# Patient Record
Sex: MALE | Race: WHITE | NOT HISPANIC OR LATINO | Employment: OTHER | URBAN - METROPOLITAN AREA
[De-identification: names, ages, dates, MRNs, and addresses within clinical notes are randomized per-mention and may not be internally consistent; named-entity substitution may affect disease eponyms.]

---

## 2023-03-21 LAB — SARS-COV-2 RESULT: NOT DETECTED

## 2023-03-23 ENCOUNTER — HOSPITAL ENCOUNTER (OUTPATIENT)
Dept: DATA CONVERSION | Facility: HOSPITAL | Age: 78
End: 2023-03-25
Attending: STUDENT IN AN ORGANIZED HEALTH CARE EDUCATION/TRAINING PROGRAM | Admitting: STUDENT IN AN ORGANIZED HEALTH CARE EDUCATION/TRAINING PROGRAM
Payer: MEDICARE

## 2023-03-23 DIAGNOSIS — I10 ESSENTIAL (PRIMARY) HYPERTENSION: ICD-10-CM

## 2023-03-23 DIAGNOSIS — N13.8 OTHER OBSTRUCTIVE AND REFLUX UROPATHY: ICD-10-CM

## 2023-03-23 DIAGNOSIS — D64.9 ANEMIA, UNSPECIFIED: ICD-10-CM

## 2023-03-23 DIAGNOSIS — E11.9 TYPE 2 DIABETES MELLITUS WITHOUT COMPLICATIONS (MULTI): ICD-10-CM

## 2023-03-23 DIAGNOSIS — K21.9 GASTRO-ESOPHAGEAL REFLUX DISEASE WITHOUT ESOPHAGITIS: ICD-10-CM

## 2023-03-23 DIAGNOSIS — E78.5 HYPERLIPIDEMIA, UNSPECIFIED: ICD-10-CM

## 2023-03-23 DIAGNOSIS — N40.1 BENIGN PROSTATIC HYPERPLASIA WITH LOWER URINARY TRACT SYMPTOMS: ICD-10-CM

## 2023-03-23 DIAGNOSIS — Z79.84 LONG TERM (CURRENT) USE OF ORAL HYPOGLYCEMIC DRUGS: ICD-10-CM

## 2023-03-23 DIAGNOSIS — Z87.891 PERSONAL HISTORY OF NICOTINE DEPENDENCE: ICD-10-CM

## 2023-03-23 LAB
POCT GLUCOSE: 80 MG/DL (ref 74–99)
POCT GLUCOSE: 82 MG/DL (ref 74–99)

## 2023-03-24 LAB
ANION GAP IN SER/PLAS: 11 MMOL/L (ref 10–20)
CALCIUM (MG/DL) IN SER/PLAS: 8.3 MG/DL (ref 8.6–10.3)
CARBON DIOXIDE, TOTAL (MMOL/L) IN SER/PLAS: 26 MMOL/L (ref 21–32)
CHLORIDE (MMOL/L) IN SER/PLAS: 105 MMOL/L (ref 98–107)
CREATININE (MG/DL) IN SER/PLAS: 1.19 MG/DL (ref 0.5–1.3)
ERYTHROCYTE DISTRIBUTION WIDTH (RATIO) BY AUTOMATED COUNT: 12.5 % (ref 11.5–14.5)
ERYTHROCYTE MEAN CORPUSCULAR HEMOGLOBIN CONCENTRATION (G/DL) BY AUTOMATED: 33.6 G/DL (ref 32–36)
ERYTHROCYTE MEAN CORPUSCULAR VOLUME (FL) BY AUTOMATED COUNT: 94 FL (ref 80–100)
ERYTHROCYTES (10*6/UL) IN BLOOD BY AUTOMATED COUNT: 3.5 X10E12/L (ref 4.5–5.9)
GFR MALE: 63 ML/MIN/1.73M2
GLUCOSE (MG/DL) IN SER/PLAS: 93 MG/DL (ref 74–99)
HEMATOCRIT (%) IN BLOOD BY AUTOMATED COUNT: 33 % (ref 41–52)
HEMOGLOBIN (G/DL) IN BLOOD: 11.1 G/DL (ref 13.5–17.5)
LEUKOCYTES (10*3/UL) IN BLOOD BY AUTOMATED COUNT: 11.5 X10E9/L (ref 4.4–11.3)
PLATELETS (10*3/UL) IN BLOOD AUTOMATED COUNT: 161 X10E9/L (ref 150–450)
POCT GLUCOSE: 89 MG/DL (ref 74–99)
POTASSIUM (MMOL/L) IN SER/PLAS: 4.5 MMOL/L (ref 3.5–5.3)
SODIUM (MMOL/L) IN SER/PLAS: 137 MMOL/L (ref 136–145)
UREA NITROGEN (MG/DL) IN SER/PLAS: 14 MG/DL (ref 6–23)

## 2023-03-28 LAB
COMPLETE PATHOLOGY REPORT: NORMAL
CONVERTED CLINICAL DIAGNOSIS-HISTORY: NORMAL
CONVERTED FINAL DIAGNOSIS: NORMAL
CONVERTED FINAL REPORT PDF LINK TO COPY AND PASTE: NORMAL
CONVERTED GROSS DESCRIPTION: NORMAL

## 2023-08-31 PROBLEM — N40.1 BENIGN PROSTATIC HYPERPLASIA WITH LOWER URINARY TRACT SYMPTOMS: Status: ACTIVE | Noted: 2023-08-31

## 2023-08-31 PROBLEM — R31.9 HEMATURIA: Status: ACTIVE | Noted: 2019-06-14

## 2023-08-31 PROBLEM — J35.8 MUCOCELE OF TONSIL: Status: ACTIVE | Noted: 2021-01-25

## 2023-08-31 PROBLEM — I82.90 VENOUS THROMBOSIS: Status: ACTIVE | Noted: 2023-08-31

## 2023-08-31 PROBLEM — I10 ESSENTIAL (PRIMARY) HYPERTENSION: Status: ACTIVE | Noted: 2018-08-05

## 2023-08-31 PROBLEM — M50.322 OTHER CERVICAL DISC DEGENERATION AT C5-C6 LEVEL: Status: ACTIVE | Noted: 2019-03-27

## 2023-08-31 PROBLEM — N18.30 STAGE 3 CHRONIC KIDNEY DISEASE (MULTI): Status: ACTIVE | Noted: 2018-08-13

## 2023-08-31 PROBLEM — M54.2 CERVICALGIA: Status: ACTIVE | Noted: 2019-03-27

## 2023-08-31 PROBLEM — N28.9 ABNORMAL KIDNEY FUNCTION: Status: ACTIVE | Noted: 2018-08-12

## 2023-08-31 PROBLEM — R13.10 DYSPHAGIA: Status: ACTIVE | Noted: 2020-05-26

## 2023-08-31 PROBLEM — E78.5 HYPERLIPIDEMIA: Status: ACTIVE | Noted: 2019-06-14

## 2023-08-31 PROBLEM — E03.9 HYPOTHYROIDISM, UNSPECIFIED: Status: ACTIVE | Noted: 2020-11-19

## 2023-08-31 PROBLEM — R07.9 CHEST PAIN: Status: ACTIVE | Noted: 2023-08-31

## 2023-08-31 PROBLEM — J06.9 UPPER RESPIRATORY INFECTION: Status: ACTIVE | Noted: 2021-01-25

## 2023-08-31 PROBLEM — R74.01 ELEVATED TRANSAMINASE LEVEL: Status: ACTIVE | Noted: 2020-11-19

## 2023-08-31 PROBLEM — I10 BENIGN ESSENTIAL HTN: Status: ACTIVE | Noted: 2018-08-13

## 2023-08-31 PROBLEM — E78.2 MIXED HYPERLIPIDEMIA: Status: ACTIVE | Noted: 2018-08-28

## 2023-08-31 PROBLEM — K21.9 GASTROESOPHAGEAL REFLUX DISEASE: Status: ACTIVE | Noted: 2019-06-14

## 2023-08-31 PROBLEM — E11.9 TYPE 2 DIABETES MELLITUS WITHOUT COMPLICATIONS (MULTI): Status: ACTIVE | Noted: 2018-08-13

## 2023-08-31 PROBLEM — K40.90 LEFT INGUINAL HERNIA: Status: ACTIVE | Noted: 2023-08-31

## 2023-08-31 PROBLEM — K56.609 SMALL BOWEL OBSTRUCTION (MULTI): Status: ACTIVE | Noted: 2019-02-20

## 2023-08-31 PROBLEM — M54.12 CERVICAL RADICULITIS: Status: ACTIVE | Noted: 2019-03-27

## 2023-08-31 PROBLEM — K42.9 UMBILICAL HERNIA: Status: ACTIVE | Noted: 2023-08-31

## 2023-08-31 RX ORDER — METFORMIN HYDROCHLORIDE 500 MG/1
500 TABLET ORAL
COMMUNITY
Start: 2023-05-12 | End: 2023-12-21 | Stop reason: SDUPTHER

## 2023-08-31 RX ORDER — DESOXIMETASONE 2.5 MG/G
1 CREAM TOPICAL 2 TIMES DAILY
COMMUNITY

## 2023-08-31 RX ORDER — FINASTERIDE 5 MG/1
5 TABLET, FILM COATED ORAL DAILY
COMMUNITY
End: 2023-12-13 | Stop reason: WASHOUT

## 2023-08-31 RX ORDER — LISINOPRIL 20 MG/1
20 TABLET ORAL DAILY
COMMUNITY
Start: 2023-07-12 | End: 2023-11-29

## 2023-08-31 RX ORDER — ACETAMINOPHEN 325 MG/1
TABLET ORAL
COMMUNITY

## 2023-08-31 RX ORDER — MIRABEGRON 50 MG/1
50 TABLET, EXTENDED RELEASE ORAL DAILY
COMMUNITY
End: 2023-12-13 | Stop reason: WASHOUT

## 2023-08-31 RX ORDER — OMEPRAZOLE 40 MG/1
40 CAPSULE, DELAYED RELEASE ORAL
COMMUNITY
Start: 2023-07-12 | End: 2023-11-24

## 2023-08-31 RX ORDER — TAMSULOSIN HYDROCHLORIDE 0.4 MG/1
2 CAPSULE ORAL DAILY
COMMUNITY
Start: 2022-04-28 | End: 2023-12-13 | Stop reason: WASHOUT

## 2023-08-31 RX ORDER — ATORVASTATIN CALCIUM 20 MG/1
20 TABLET, FILM COATED ORAL DAILY
COMMUNITY
Start: 2023-07-12 | End: 2023-12-08

## 2023-08-31 RX ORDER — ECONAZOLE NITRATE 10 MG/G
1 CREAM TOPICAL DAILY
COMMUNITY

## 2023-08-31 RX ORDER — PSYLLIUM HUSK 3.4 G/5.8G
3.4 POWDER ORAL DAILY
COMMUNITY
Start: 2020-05-26

## 2023-08-31 RX ORDER — EMOLLIENT COMBINATION NO.76
1 LOTION (ML) TOPICAL
COMMUNITY
Start: 2020-05-26

## 2023-09-08 VITALS — WEIGHT: 160.72 LBS | BODY MASS INDEX: 25.22 KG/M2 | HEIGHT: 67 IN

## 2023-09-14 NOTE — DISCHARGE SUMMARY
Send Summary:   Discharge Summary Providers:  Provider Role Provider Name   · Attending Ramirez Mireles   · Referring Ramirez Mireles   · Primary Lawanda Garcia       Note Recipients: Lawanda Garcia, APRN-CNP - [phone]  []  Ramirez Mireles MD       Discharge:    Summary:   Admission Date: .23-Mar-2023 10:56:00   Discharge Date: 25-Mar-2023   Attending Physician at Discharge: Ramirez Mireles   Admission Reason: BPH with obstruction   Final Discharge Diagnoses: Transurethral Resection  of Prostate (TURP)   Procedures: Date: 23-Mar-2023 15:35:00  Procedure Name: 1. Transurethral Resection of Prostate   Condition at Discharge: Satisfactory   Disposition at Discharge: .Home   Vital Signs:        T   P  R  BP   MAP  SpO2   Value  36.2  60  18  114/49   92  100%  Date/Time 3/24 10:19 3/24 10:19 3/24 10:19 3/24 10:19  3/24 5:20 3/24 10:19  Range  (36.2C - 36.3C )  (52 - 79 )  (16 - 18 )  (114 - 166 )/ (49 - 80 )  (89 - 109 )  (95% - 100% )    Date:            Weight/Scale Type:  Height:   23-Mar-2023 16:52  72.9  kg / bed  170.1  cm  Physical Exam:    PHYSICAL EXAM:  General:  NAD, well-nourished, well-developed  HEENT:  NC/AT, MMM, PERRLA, EOMI  Pulmonary: CTA bilaterally, neck supple, no LAD  Cardiovascular:  RRR, no M/R/G  Abdomen: soft, NT/ND, +BSx4  Extremities: no C/C/E, PPPx4, ROZ x4  Neurological: CN II-XII intact, gait WNL, no focal abnormalities  Skin: dry and warm, no rashes    Hospital Course:    77 year old male s/p Transurethral Resection of Prostate on 3-23-23 with Dr. Mireles.  The patient tolerated the procedure well and there were no complications.   Labs showed a very mild anemia with H/H 11.1/33.0.  A chandra catheter was left in place and removed prior to discharge.  He was able to void without difficulty.  Mr. Henson was tolerating a regular diet and ambulating without difficulty therefore he was  discharged home with instructions to follow-up with Dr. Mireles in two weeks.        Discharge Information:    and Continuing Care:   Lab Results - Pending:    Surgical Pathology Drawn at 23-Mar-2023 14:57:00  Radiology Results - Pending: None   Discharge Instructions:    Activity:           activity as tolerated.          May shower..            May not return to school/work.            Walk around intermittently throughout the day, stay active.    Nutrition/Diet:           diabetic/carbohydrate counted          Encourage Fluids:   Drink plenty of water daily, stay hydrated.    Follow Up Appointments:    Follow-Up Appointment 01:           Physician/Dept/Service:   Dr. Mireles          Reason for Referral:   Urology          Call to Schedule in:   2 weeks          Phone Number:   [phone]    Discharge Medications: Home Medication   lisinopril 20 mg oral tablet - 1 tab(s) orally once a day  Metamucil 3.4 g/3.7 g oral powder for reconstitution - 1 dose(s) orally once a day  metFORMIN 500 mg oral tablet, extended release - 1 tab(s) orally once a day  omeprazole 40 mg oral delayed release capsule - 1 cap(s) orally once a day  atorvastatin 20 mg oral tablet - 1 tab(s) orally once a day (at bedtime)  PreserVision AREDS oral capsule - 1 cap(s) orally once a day  Curel Daily Healing topical lotion - Apply topically to affected area once a day     PRN Medication   Econazole Nitrate 1% topical cream - Apply topically to affected area once a day, As Needed  fluocinolone 0.01% topical oil - Apply topically to affected area 2 times a day, As Needed  desoximetasone 0.05% topical cream - Apply topically to affected area 2 times a day, As Needed   Issues to Discuss at Follow-up / Goals for Continuing Care:    voiding problems?   activity     DNR Status:   ·  Code Status Code Status order at time of discharge: Full Code       Electronic Signatures:  Chelly Bennett (PHYSICIAN))  (Signed 24-Mar-2023 14:46)   Authored: Send Summary, Summary Content, Ongoing Care,  DNR Status, Note Completion      Last  Updated: 24-Mar-2023 14:46 by Chelly Bennett (PA (PHYSICIAN))

## 2023-09-14 NOTE — H&P
History & Physical Reviewed:   I have reviewed the History and Physical dated:  16-Mar-2023   History and Physical reviewed and relevant findings noted. Patient examined to review pertinent physical  findings.: No significant changes   Home Medications Reviewed: no changes noted   Allergies Reviewed: no changes noted       ERAS (Enhanced Recovery After Surgery):  ·  ERAS Patient: no     Consent:   COVID-19 Consent:  ·  COVID-19 Risk Consent Surgeon has reviewed key risks related to the risk of zach COVID-19 and if they contract COVID-19 what the risks are.       Electronic Signatures:  Ramirez Mireles)  (Signed 23-Mar-2023 07:28)   Authored: History & Physical Reviewed, ERAS, Consent,  Note Completion      Last Updated: 23-Mar-2023 07:28 by Ramirez Mireles)

## 2023-10-02 NOTE — OP NOTE
PROCEDURE DETAILS    Preoperative Diagnosis:  Benign Prostatic Hyperplasia with Obstruction  Postoperative Diagnosis:  Benign Prostatic Hyperplasia with Obstruction  Surgeon: Ramirez Mireles  Resident/Fellow/Other Assistant: Shravan Cazares    Procedure:  1. Transurethral Resection of Prostate  Anesthesia: Jan Espinoza  Estimated Blood Loss: 10 cc  Findings: obstructive median lobe and high bladder neck  Specimens(s) Collected: yes,  Prostate tissue chips   Complications: None  Drains and/or Catheters: 22 Fr 3-way Ram  Patient Returned To/Condition: PACU/stable        Operative Report:   Patient was consented and antibiotics  were started on-call to the OR.  In the operating room, under general anesthesia, patient placed in dorsolithotomy position, genitalia was prepped and draped in usual manner.  No 22 cystoscope was first inserted down the urethra and prostate, revealing an obstructive median lobe and high bladder neck, with bulging lateral lobes. Bladder was intact with normal orthotopic ureteral orifices, no tumors or stones.  No. 26 resectoscope was inserted down the urethra into the prostate and bladder.  Using the loop on the working element, the median lobe was first resected from bladder neck to veromontanum, then the lateral lobes were resected circumferentially until  the prostatic fossa was wide open. Extensive hemostasis was performed.  All resected chips were removed and sent for pathological examination.   No 22 French 3-way Ram catheter was inserted, and returned urine was light pink in color.  Patient was awakened and transferred to PACU in stable condition.                                Attestation:   Note Completion:  Attending Attestation I performed the procedure without a resident         Electronic Signatures:  Ramirez Mireles)  (Signed 23-Mar-2023 15:41)   Authored: Post-Operative Note, Chart Review, Note Completion      Last Updated: 23-Mar-2023 15:41 by Chi  Ramirez STEWART)

## 2023-10-09 ENCOUNTER — APPOINTMENT (OUTPATIENT)
Dept: UROLOGY | Facility: CLINIC | Age: 78
End: 2023-10-09
Payer: MEDICARE

## 2023-11-02 ENCOUNTER — OFFICE VISIT (OUTPATIENT)
Dept: UROLOGY | Facility: CLINIC | Age: 78
End: 2023-11-02
Payer: MEDICARE

## 2023-11-02 DIAGNOSIS — N40.1 BENIGN PROSTATIC HYPERPLASIA WITH URINARY FREQUENCY: ICD-10-CM

## 2023-11-02 DIAGNOSIS — R35.0 BENIGN PROSTATIC HYPERPLASIA WITH URINARY FREQUENCY: ICD-10-CM

## 2023-11-02 PROCEDURE — 3074F SYST BP LT 130 MM HG: CPT | Performed by: STUDENT IN AN ORGANIZED HEALTH CARE EDUCATION/TRAINING PROGRAM

## 2023-11-02 PROCEDURE — 3078F DIAST BP <80 MM HG: CPT | Performed by: STUDENT IN AN ORGANIZED HEALTH CARE EDUCATION/TRAINING PROGRAM

## 2023-11-02 PROCEDURE — 99214 OFFICE O/P EST MOD 30 MIN: CPT | Performed by: STUDENT IN AN ORGANIZED HEALTH CARE EDUCATION/TRAINING PROGRAM

## 2023-11-02 RX ORDER — SOLIFENACIN SUCCINATE 10 MG/1
10 TABLET, FILM COATED ORAL DAILY
Qty: 30 TABLET | Refills: 11 | Status: SHIPPED | OUTPATIENT
Start: 2023-11-02 | End: 2024-11-01

## 2023-11-02 NOTE — PROGRESS NOTES
Subjective     HPI  Pablo Henson is a 78 y.o. male presents for a follow up on BPH/LUTS and s/p TURP on 03/23/2023.    Taking Myrbetriq but is unsure if it has helped his LUTS. Still experiencing urgency and leakage, not satisfied about this issue however overall learning to accept it.    PATHOLOGY 3/23/2023  FINAL DIAGNOSIS   A.  PROSTATE TISSUE:   PROSTATIC TISSUE WITH GLANDULAR HYPERPLASIA AND STROMAL HYPERTROPHY.     Assessment/Plan   There are no diagnoses linked to this encounter.    Pablo Henson is a 78 y.o. male presents for a follow up on BPH/LUTS and s/p TURP on 03/23/2023.    We discussed his LUTS and his response to Myrbetriq. He is not responding to the medication appropriately so we discussed other pharmacological options.      Plan:  Stop Myrbetriq  Start Vesicare 10mg daily  Fu in 3-4 months      Scribe Attestation  By signing my name below, I, Love Keller , Scribe   attest that this documentation has been prepared under the direction and in the presence of Ramirez Mireles MD.

## 2023-11-10 ENCOUNTER — TELEPHONE (OUTPATIENT)
Dept: PRIMARY CARE | Facility: CLINIC | Age: 78
End: 2023-11-10
Payer: MEDICARE

## 2023-11-10 DIAGNOSIS — I10 BENIGN ESSENTIAL HTN: ICD-10-CM

## 2023-11-10 DIAGNOSIS — E78.5 HYPERLIPIDEMIA, UNSPECIFIED HYPERLIPIDEMIA TYPE: ICD-10-CM

## 2023-11-10 DIAGNOSIS — E78.2 MIXED HYPERLIPIDEMIA: ICD-10-CM

## 2023-11-10 DIAGNOSIS — E03.9 HYPOTHYROIDISM, UNSPECIFIED TYPE: ICD-10-CM

## 2023-11-10 DIAGNOSIS — N18.30 STAGE 3 CHRONIC KIDNEY DISEASE, UNSPECIFIED WHETHER STAGE 3A OR 3B CKD (MULTI): ICD-10-CM

## 2023-11-10 DIAGNOSIS — E11.9 TYPE 2 DIABETES MELLITUS WITHOUT COMPLICATION, WITHOUT LONG-TERM CURRENT USE OF INSULIN (MULTI): ICD-10-CM

## 2023-12-06 ENCOUNTER — LAB (OUTPATIENT)
Dept: LAB | Facility: LAB | Age: 78
End: 2023-12-06
Payer: MEDICARE

## 2023-12-06 DIAGNOSIS — E78.5 HYPERLIPIDEMIA, UNSPECIFIED HYPERLIPIDEMIA TYPE: ICD-10-CM

## 2023-12-06 DIAGNOSIS — E03.9 HYPOTHYROIDISM, UNSPECIFIED TYPE: ICD-10-CM

## 2023-12-06 DIAGNOSIS — E78.2 MIXED HYPERLIPIDEMIA: ICD-10-CM

## 2023-12-06 DIAGNOSIS — E11.9 TYPE 2 DIABETES MELLITUS WITHOUT COMPLICATION, WITHOUT LONG-TERM CURRENT USE OF INSULIN (MULTI): ICD-10-CM

## 2023-12-06 DIAGNOSIS — I10 BENIGN ESSENTIAL HTN: ICD-10-CM

## 2023-12-06 DIAGNOSIS — N18.30 STAGE 3 CHRONIC KIDNEY DISEASE, UNSPECIFIED WHETHER STAGE 3A OR 3B CKD (MULTI): ICD-10-CM

## 2023-12-06 LAB
ALBUMIN SERPL-MCNC: 4.5 G/DL (ref 3.5–5)
ALP BLD-CCNC: 122 U/L (ref 35–125)
ALT SERPL-CCNC: 41 U/L (ref 5–40)
ANION GAP SERPL CALC-SCNC: 14 MMOL/L
AST SERPL-CCNC: 23 U/L (ref 5–40)
BASOPHILS # BLD AUTO: 0.04 X10*3/UL (ref 0–0.1)
BASOPHILS NFR BLD AUTO: 0.6 %
BILIRUB SERPL-MCNC: 0.6 MG/DL (ref 0.1–1.2)
BUN SERPL-MCNC: 20 MG/DL (ref 8–25)
CALCIUM SERPL-MCNC: 9.4 MG/DL (ref 8.5–10.4)
CHLORIDE SERPL-SCNC: 101 MMOL/L (ref 97–107)
CHOLEST SERPL-MCNC: 122 MG/DL (ref 133–200)
CHOLEST/HDLC SERPL: 2.9 {RATIO}
CO2 SERPL-SCNC: 24 MMOL/L (ref 24–31)
CREAT SERPL-MCNC: 1.6 MG/DL (ref 0.4–1.6)
EOSINOPHIL # BLD AUTO: 0.19 X10*3/UL (ref 0–0.4)
EOSINOPHIL NFR BLD AUTO: 2.8 %
ERYTHROCYTE [DISTWIDTH] IN BLOOD BY AUTOMATED COUNT: 12.4 % (ref 11.5–14.5)
EST. AVERAGE GLUCOSE BLD GHB EST-MCNC: 123 MG/DL
GFR SERPL CREATININE-BSD FRML MDRD: 44 ML/MIN/1.73M*2
GLUCOSE SERPL-MCNC: 100 MG/DL (ref 65–99)
HBA1C MFR BLD: 5.9 %
HCT VFR BLD AUTO: 42.8 % (ref 41–52)
HDLC SERPL-MCNC: 42 MG/DL
HGB BLD-MCNC: 14.3 G/DL (ref 13.5–17.5)
IMM GRANULOCYTES # BLD AUTO: 0.02 X10*3/UL (ref 0–0.5)
IMM GRANULOCYTES NFR BLD AUTO: 0.3 % (ref 0–0.9)
LDLC SERPL CALC-MCNC: 52 MG/DL (ref 65–130)
LYMPHOCYTES # BLD AUTO: 1.92 X10*3/UL (ref 0.8–3)
LYMPHOCYTES NFR BLD AUTO: 28 %
MCH RBC QN AUTO: 31.8 PG (ref 26–34)
MCHC RBC AUTO-ENTMCNC: 33.4 G/DL (ref 32–36)
MCV RBC AUTO: 95 FL (ref 80–100)
MONOCYTES # BLD AUTO: 0.62 X10*3/UL (ref 0.05–0.8)
MONOCYTES NFR BLD AUTO: 9.1 %
NEUTROPHILS # BLD AUTO: 4.06 X10*3/UL (ref 1.6–5.5)
NEUTROPHILS NFR BLD AUTO: 59.2 %
NRBC BLD-RTO: 0 /100 WBCS (ref 0–0)
PLATELET # BLD AUTO: 194 X10*3/UL (ref 150–450)
POTASSIUM SERPL-SCNC: 4.7 MMOL/L (ref 3.4–5.1)
PROT SERPL-MCNC: 6.9 G/DL (ref 5.9–7.9)
RBC # BLD AUTO: 4.5 X10*6/UL (ref 4.5–5.9)
SODIUM SERPL-SCNC: 139 MMOL/L (ref 133–145)
T4 FREE SERPL-MCNC: 1.3 NG/DL (ref 0.9–1.7)
TRIGL SERPL-MCNC: 141 MG/DL (ref 40–150)
TSH SERPL DL<=0.05 MIU/L-ACNC: 5.71 MIU/L (ref 0.27–4.2)
WBC # BLD AUTO: 6.9 X10*3/UL (ref 4.4–11.3)

## 2023-12-06 PROCEDURE — 84443 ASSAY THYROID STIM HORMONE: CPT

## 2023-12-06 PROCEDURE — 85025 COMPLETE CBC W/AUTO DIFF WBC: CPT

## 2023-12-06 PROCEDURE — 80053 COMPREHEN METABOLIC PANEL: CPT

## 2023-12-06 PROCEDURE — 84439 ASSAY OF FREE THYROXINE: CPT

## 2023-12-06 PROCEDURE — 36415 COLL VENOUS BLD VENIPUNCTURE: CPT

## 2023-12-06 PROCEDURE — 80061 LIPID PANEL: CPT

## 2023-12-06 PROCEDURE — 83036 HEMOGLOBIN GLYCOSYLATED A1C: CPT

## 2023-12-07 ENCOUNTER — TELEPHONE (OUTPATIENT)
Dept: PRIMARY CARE | Facility: CLINIC | Age: 78
End: 2023-12-07

## 2023-12-07 DIAGNOSIS — E11.9 TYPE 2 DIABETES MELLITUS WITHOUT COMPLICATION, UNSPECIFIED WHETHER LONG TERM INSULIN USE (MULTI): ICD-10-CM

## 2023-12-07 DIAGNOSIS — E78.2 MIXED HYPERLIPIDEMIA: Primary | ICD-10-CM

## 2023-12-08 RX ORDER — ATORVASTATIN CALCIUM 20 MG/1
20 TABLET, FILM COATED ORAL DAILY
Qty: 90 TABLET | Refills: 3 | Status: SHIPPED | OUTPATIENT
Start: 2023-12-08

## 2023-12-13 ENCOUNTER — TELEPHONE (OUTPATIENT)
Dept: PRIMARY CARE | Facility: CLINIC | Age: 78
End: 2023-12-13

## 2023-12-13 ENCOUNTER — OFFICE VISIT (OUTPATIENT)
Dept: PRIMARY CARE | Facility: CLINIC | Age: 78
End: 2023-12-13
Payer: MEDICARE

## 2023-12-13 VITALS
DIASTOLIC BLOOD PRESSURE: 60 MMHG | HEART RATE: 62 BPM | SYSTOLIC BLOOD PRESSURE: 118 MMHG | OXYGEN SATURATION: 99 % | BODY MASS INDEX: 26.68 KG/M2 | WEIGHT: 166 LBS | HEIGHT: 66 IN

## 2023-12-13 DIAGNOSIS — I10 BENIGN ESSENTIAL HTN: ICD-10-CM

## 2023-12-13 DIAGNOSIS — E11.9 TYPE 2 DIABETES MELLITUS WITHOUT COMPLICATION, WITHOUT LONG-TERM CURRENT USE OF INSULIN (MULTI): ICD-10-CM

## 2023-12-13 DIAGNOSIS — Z00.00 WELL ADULT EXAM: Primary | ICD-10-CM

## 2023-12-13 DIAGNOSIS — I10 ESSENTIAL (PRIMARY) HYPERTENSION: ICD-10-CM

## 2023-12-13 DIAGNOSIS — E03.8 OTHER SPECIFIED HYPOTHYROIDISM: ICD-10-CM

## 2023-12-13 DIAGNOSIS — E11.9 TYPE 2 DIABETES MELLITUS WITHOUT COMPLICATION, UNSPECIFIED WHETHER LONG TERM INSULIN USE (MULTI): ICD-10-CM

## 2023-12-13 DIAGNOSIS — E78.2 MIXED HYPERLIPIDEMIA: ICD-10-CM

## 2023-12-13 PROCEDURE — 1159F MED LIST DOCD IN RCRD: CPT | Performed by: NURSE PRACTITIONER

## 2023-12-13 PROCEDURE — G0439 PPPS, SUBSEQ VISIT: HCPCS | Performed by: NURSE PRACTITIONER

## 2023-12-13 PROCEDURE — 1126F AMNT PAIN NOTED NONE PRSNT: CPT | Performed by: NURSE PRACTITIONER

## 2023-12-13 PROCEDURE — 3074F SYST BP LT 130 MM HG: CPT | Performed by: NURSE PRACTITIONER

## 2023-12-13 PROCEDURE — 3078F DIAST BP <80 MM HG: CPT | Performed by: NURSE PRACTITIONER

## 2023-12-13 PROCEDURE — 1036F TOBACCO NON-USER: CPT | Performed by: NURSE PRACTITIONER

## 2023-12-13 ASSESSMENT — PATIENT HEALTH QUESTIONNAIRE - PHQ9
SUM OF ALL RESPONSES TO PHQ9 QUESTIONS 1 AND 2: 0
2. FEELING DOWN, DEPRESSED OR HOPELESS: NOT AT ALL
1. LITTLE INTEREST OR PLEASURE IN DOING THINGS: NOT AT ALL

## 2023-12-13 ASSESSMENT — PAIN SCALES - GENERAL: PAINLEVEL: 0-NO PAIN

## 2023-12-13 NOTE — PROGRESS NOTES
Subjective   Patient ID: Pablo Henson is a 78 y.o. male who presents for Annual Exam.    CHRISTOPHER Celaya is a 76 yo M who Presents for Annual Wellness Visit.  PMHx, FMHx, SHx, and Social history reviewed and updated in chart. PHQ2 reviewed. Mini-cog test reviewed. Advanced Care planning reviewed.  Self assessment of Health - fair  Issues with ADLs - none  Issues with IADLs -none  Issues with home safety -none    diet is varied  pt is active  former smoker    CHRONIC ISSUES:  DM  CKD  HLD  Bowel obstruction 2019.  Diverticulosis  BPH and incontinence. Had TURP by urology. wears depends due to urinary leakage  Now on vesicare    PSH - Hernia repair 2019    IMMUNIZATION:  Reviewed    COLORECTAL SCREENING  - Last colonoscopy - 2019 Dr Littlejohn  - Next colonoscopy due - Aged out  - Relevant PFHx - None    BLOOD PRESSURE SCREEN:  vitals reviewed  Current treatment if any - Lisinopril 20    DIABETES SCREEN:  Diabetic review:  Last A1c - 5.9  Next A1c - 6mo  Current treatment:  - Metformin 500mg daily    LUNG CANCER SCREEN:  55-80yrs with >30pky and quit <15yrs ago  FORMER smoker  Quit - 1992    TOBACCO ABUSE SCREEN:  Updated in histories tab - No concerns    ALCOHOL ABUSE SCREEN:  Updated in histories tab - No concerns  - Occasional    DEPRESSION SCREEN - PHq2:  Over the past 2 weeks, how often have you been bothered by  1 - Little interest or pleasure in doing things -  2 - Feeling down, depressed or hopeless -  (not at all 0, Several days 1, More than half of days 2, Nearly every day 3)    SCREEN HYPERLIPIDEMIA:  Previous treatment - Atorvastatin 20  ASCVD risk at this visit - LDL 56  Aspirin if >10%? -  Significant PFHx - Both parents with CVA (DM and smokers)    SEXUAL HEALTH:  HCV if born [birth year] once - Negative 2020  HIV, HBV, VDRL if high risk -    MALE SPECIFIC:    PROSTATE CANCER SCREENING:  - Last PSA - 3/22  - Previous treatment - seeing urology  - PFHx -  - Future plan - Discussed risk of prostate cancer by age  "discussed risk of finding and treating disease that is unlikely to be fatal. Discussed mortality in screened vs non screened populations. After this discussion we have decided to no longer screen for prostate cancer.    AAA SCREEN:  (65-75yro males who have ever smoked)  advised and will consider    URINARY/:  Nocturia - q1-2hr - recent increase  Frequency - yes  sees urology    Past medical/surgical/family and social histories updated in history tab.    Review of Systems  Constitutional Symptoms: negative for fever, loss of appetite, headaches, fatigue.   Eyes: negative for loss and blurring of vision, double vision.   Ear, Nose, Mouth, Throat: negative for hearing loss, tinnitus, nasal congestion, rhinorrhea, nose bleeds, teeth problems, mouth sores, gum disease, dysphagia, sore throat.   Cardiovascular: negative for chest pain/pressure, palpitations, edema, claudication.   Respiratory: negative for shortness of breath, dyspnea on exertion, pain with breathing, coughing.   Breast: negative for tenderness, masses, gynecomastia.   Gastrointestinal: negative for anorexia, indigestion, nausea, vomiting, abdominal pain, change in bowel habits, diarrhea, constipation, hematochezia, melena, blood in stool.    ; Negative for hematuria, discharge. Positive for BPH, urinary leakage, frequency, LUTS  Musculoskeletal: negative for joint pain, joint swelling, myalgias, cramps.   Integumentary: negative for change in mole, skin trouble or rash.   Neurological: negative for headache, numbness, tingling, weakness, tremors.   Psychiatric: negative for depression, anxiety.   Endocrine: negative for weight gain, heat or cold intolerance, polyuria, polydipsia, polyphagia.   Hematologic/Lymphatic: negative for bruising, abnormal bleeding, swollen glands.      Objective   /60   Pulse 62   Ht 1.676 m (5' 6\")   Wt 75.3 kg (166 lb)   SpO2 99%   BMI 26.79 kg/m²     Physical Exam  General Appearance: Vital Signs have been " reviewed. Comfortable. Well nourished, and well developed. He is awake, alert, and oriented and appears his stated age. The patient is cooperative with exam.  Head: Hair pattern reveals a normal pattern for patient's age and face shows no abnormalities.  Eyes: PERRLA, EOMI, conjunctiva and sclera clear. Extraocular muscle exam reveals EOMI.  Ears, Nose, Mouth, and Throat: EARS: External bilateral ears reveals normal helix, tragus and ear lobe. Bilateral canals are normal. Both tympanic membranes are pearly gray and landmarks normal.   NOSE: Nasal mucosa in both nostrils reveals no polyps, ulcerations, or lesions. Teeth reveal good repair. Posterior pharynx reveals no abnormalities.  Neck: Neck reveals supple, no adenopathy, no thyromegaly, or carotid bruits.  Chest: Lungs are clear to auscultation bilaterally with no wheezes, rales, or rhonchi.  Cardiovascular: RRR without MRG. Bilateral DP pulses are 2+. Extremities reveal no cyanosis, clubbing, or edema.  Abdomen: Abdomen is soft, NT, ND with no masses.   Genitourinary:   Musculoskeletal: 5/5 and equal strength in bilateral upper and lower extremities.  Skin: Skin reveals good turgor and no rashes.  Neurological: Neuro: Intact and non-focal.  Psychiatric: Patient has appropriate judgement. Patient has good insight. Patient's mood is appropriate.    Assessment/Plan   Diagnoses and all orders for this visit:  Well adult exam  Continue current medications  healthy diet  DASH diet  exercise  safety  Essential (primary) hypertension  Other specified hypothyroidism  -     TSH with reflex to Free T4 if abnormal; Future  Tsh slightly elevated. No complaints  Recheck 3 months  Mixed hyperlipidemia  Type 2 diabetes mellitus without complication, without long-term current use of insulin (CMS/[facility])  Pt to consider discontinuation of metformin - he would like to wait until after the holidays  Other orders  -     Follow Up In Primary Care - Established; Future

## 2023-12-13 NOTE — PATIENT INSTRUCTIONS
Thank you for choosing our office for your healthcare needs    Please continue your current medications  Work on healthy diet and regular exercise    We will recheck your thyroid level in 3 months.  The order is in the computer.  Please obtain the end of March and I will call you with results    Plan to follow-up in 6 months and as needed

## 2023-12-21 RX ORDER — METFORMIN HYDROCHLORIDE 500 MG/1
500 TABLET ORAL
Qty: 90 TABLET | Refills: 1 | Status: SHIPPED | OUTPATIENT
Start: 2023-12-21 | End: 2024-06-06

## 2024-02-01 ENCOUNTER — OFFICE VISIT (OUTPATIENT)
Dept: UROLOGY | Facility: CLINIC | Age: 79
End: 2024-02-01
Payer: MEDICARE

## 2024-02-01 DIAGNOSIS — N40.1 BENIGN PROSTATIC HYPERPLASIA WITH LOWER URINARY TRACT SYMPTOMS, SYMPTOM DETAILS UNSPECIFIED: ICD-10-CM

## 2024-02-01 DIAGNOSIS — N32.81 OVERACTIVE BLADDER: Primary | ICD-10-CM

## 2024-02-01 PROCEDURE — 1126F AMNT PAIN NOTED NONE PRSNT: CPT | Performed by: STUDENT IN AN ORGANIZED HEALTH CARE EDUCATION/TRAINING PROGRAM

## 2024-02-01 PROCEDURE — 99213 OFFICE O/P EST LOW 20 MIN: CPT | Performed by: STUDENT IN AN ORGANIZED HEALTH CARE EDUCATION/TRAINING PROGRAM

## 2024-02-01 PROCEDURE — 1036F TOBACCO NON-USER: CPT | Performed by: STUDENT IN AN ORGANIZED HEALTH CARE EDUCATION/TRAINING PROGRAM

## 2024-03-12 ENCOUNTER — LAB (OUTPATIENT)
Dept: LAB | Facility: LAB | Age: 79
End: 2024-03-12
Payer: MEDICARE

## 2024-03-12 DIAGNOSIS — E03.8 OTHER SPECIFIED HYPOTHYROIDISM: ICD-10-CM

## 2024-03-12 LAB — TSH SERPL DL<=0.05 MIU/L-ACNC: 3.05 MIU/L (ref 0.27–4.2)

## 2024-03-12 PROCEDURE — 36415 COLL VENOUS BLD VENIPUNCTURE: CPT

## 2024-03-12 PROCEDURE — 84443 ASSAY THYROID STIM HORMONE: CPT

## 2024-06-13 ENCOUNTER — APPOINTMENT (OUTPATIENT)
Dept: PRIMARY CARE | Facility: CLINIC | Age: 79
End: 2024-06-13
Payer: MEDICARE

## 2024-06-13 ENCOUNTER — TELEPHONE (OUTPATIENT)
Dept: PRIMARY CARE | Facility: CLINIC | Age: 79
End: 2024-06-13

## 2024-06-13 VITALS
BODY MASS INDEX: 25.92 KG/M2 | WEIGHT: 160.6 LBS | DIASTOLIC BLOOD PRESSURE: 58 MMHG | HEART RATE: 61 BPM | OXYGEN SATURATION: 99 % | SYSTOLIC BLOOD PRESSURE: 128 MMHG

## 2024-06-13 DIAGNOSIS — E78.5 HYPERLIPIDEMIA, UNSPECIFIED HYPERLIPIDEMIA TYPE: ICD-10-CM

## 2024-06-13 DIAGNOSIS — E11.9 TYPE 2 DIABETES MELLITUS WITHOUT COMPLICATION, UNSPECIFIED WHETHER LONG TERM INSULIN USE (MULTI): ICD-10-CM

## 2024-06-13 DIAGNOSIS — I10 ESSENTIAL (PRIMARY) HYPERTENSION: Primary | ICD-10-CM

## 2024-06-13 DIAGNOSIS — I10 BENIGN ESSENTIAL HTN: ICD-10-CM

## 2024-06-13 DIAGNOSIS — E03.9 HYPOTHYROIDISM, UNSPECIFIED TYPE: ICD-10-CM

## 2024-06-13 LAB — POC HEMOGLOBIN A1C: 6 % (ref 4.2–6.5)

## 2024-06-13 PROCEDURE — 83036 HEMOGLOBIN GLYCOSYLATED A1C: CPT | Performed by: NURSE PRACTITIONER

## 2024-06-13 PROCEDURE — 3078F DIAST BP <80 MM HG: CPT | Performed by: NURSE PRACTITIONER

## 2024-06-13 PROCEDURE — 3074F SYST BP LT 130 MM HG: CPT | Performed by: NURSE PRACTITIONER

## 2024-06-13 PROCEDURE — 1036F TOBACCO NON-USER: CPT | Performed by: NURSE PRACTITIONER

## 2024-06-13 PROCEDURE — 1158F ADVNC CARE PLAN TLK DOCD: CPT | Performed by: NURSE PRACTITIONER

## 2024-06-13 PROCEDURE — 1126F AMNT PAIN NOTED NONE PRSNT: CPT | Performed by: NURSE PRACTITIONER

## 2024-06-13 PROCEDURE — 1123F ACP DISCUSS/DSCN MKR DOCD: CPT | Performed by: NURSE PRACTITIONER

## 2024-06-13 PROCEDURE — 99213 OFFICE O/P EST LOW 20 MIN: CPT | Performed by: NURSE PRACTITIONER

## 2024-06-13 PROCEDURE — 1159F MED LIST DOCD IN RCRD: CPT | Performed by: NURSE PRACTITIONER

## 2024-06-13 ASSESSMENT — PAIN SCALES - GENERAL: PAINLEVEL: 0-NO PAIN

## 2024-06-13 NOTE — PROGRESS NOTES
Subjective   Patient ID: Pablo Henson is a 78 y.o. male who presents for Hypertension and Diabetes.    HPI   Pablo is a 77 yo M who presents for interval visit    Diet is varied  Pt is active, works around the house, walking  Former smoker    DM   Tolerating metformin once per day.  Patient has wanted to stop metformin but agrees to stay on 1 daily  Eye exam due 8/24 @ [facility]  Does not check glucose    HTN  Does not check blood pressure at home  Tolerating medications without side effect  Denies chest pain, shortness of breath, palpitations, leg claudication    No predraw blood    Review of Systems  Constitutional Symptoms: Negative for fever, loss of appetite, headaches, fatigue.   Cardiovascular: Negative for chest pain/pressure, palpitations, edema  Respiratory: Negative for shortness of breath, dyspnea on exertion, pain with breathing, coughing.   Gastrointestinal: Negative for nausea, vomiting, abdominal pain, change in bowel habits  Musculoskeletal: Negative for joint pain, joint swelling, myalgias, cramps.   Integumentary: Negative for skin trouble or rash.   Neurological: Negative for headache, numbness, tingling, weakness, tremors.   Psychiatric: Negative for depression, anxiety.   Endocrine: Negative for weight gain, heat or cold intolerance, polyuria, polydipsia, polyphagia.   Hematologic/Lymphatic: Negative for bruising, abnormal bleeding, swollen glands.     Objective   /58 (BP Location: Left arm)   Pulse 61   Wt 72.8 kg (160 lb 9.6 oz)   SpO2 99%   BMI 25.92 kg/m²     Physical Exam  alert and oriented x3, NAD  Neck supple with no JVD  Lungs CTA bilaterally  Heart with RRR with no edema.  skin warm and dry  Neuro grossly intact     Assessment/Plan   Diagnoses and all orders for this visit:  Essential (primary) hypertension\BP stable on   Continue current medications  Mediterranean diet/KIP diet  Exercise  Monitor bp and record  Obtain testing as ordered  Follow up 6 months for Medicare  wellness exam  Type 2 diabetes mellitus without complication, unspecified whether long term insulin use (Multi)  -     POCT glycosylated hemoglobin (Hb A1C) manually resulted  POCT A1c 6.0  Continue plan of care  Healthy ADA diet, exercise  Follow-up 6 months for Medicare wellness exam  Other orders  -     Follow Up In Primary Care - Established

## 2024-08-12 ENCOUNTER — APPOINTMENT (OUTPATIENT)
Dept: UROLOGY | Facility: CLINIC | Age: 79
End: 2024-08-12
Payer: MEDICARE

## 2024-10-05 DIAGNOSIS — K21.9 GERD WITHOUT ESOPHAGITIS: ICD-10-CM

## 2024-10-07 RX ORDER — OMEPRAZOLE 40 MG/1
40 CAPSULE, DELAYED RELEASE ORAL DAILY
Qty: 90 CAPSULE | Refills: 1 | Status: SHIPPED | OUTPATIENT
Start: 2024-10-07

## 2024-12-09 ENCOUNTER — LAB (OUTPATIENT)
Dept: LAB | Facility: LAB | Age: 79
End: 2024-12-09
Payer: MEDICARE

## 2024-12-09 DIAGNOSIS — I10 BENIGN ESSENTIAL HTN: Primary | ICD-10-CM

## 2024-12-09 DIAGNOSIS — E11.9 TYPE 2 DIABETES MELLITUS WITHOUT COMPLICATION, UNSPECIFIED WHETHER LONG TERM INSULIN USE (MULTI): ICD-10-CM

## 2024-12-09 DIAGNOSIS — E78.5 HYPERLIPIDEMIA, UNSPECIFIED HYPERLIPIDEMIA TYPE: ICD-10-CM

## 2024-12-09 DIAGNOSIS — E03.9 HYPOTHYROIDISM, UNSPECIFIED TYPE: ICD-10-CM

## 2024-12-09 DIAGNOSIS — I10 BENIGN ESSENTIAL HTN: ICD-10-CM

## 2024-12-09 LAB
ALBUMIN SERPL BCP-MCNC: 4.6 G/DL (ref 3.4–5)
ALP SERPL-CCNC: 103 U/L (ref 33–136)
ALT SERPL W P-5'-P-CCNC: 40 U/L (ref 10–52)
ANION GAP SERPL CALCULATED.3IONS-SCNC: 12 MMOL/L (ref 10–20)
AST SERPL W P-5'-P-CCNC: 24 U/L (ref 9–39)
BASOPHILS # BLD AUTO: 0.03 X10*3/UL (ref 0–0.1)
BASOPHILS NFR BLD AUTO: 0.5 %
BILIRUB SERPL-MCNC: 0.8 MG/DL (ref 0–1.2)
BUN SERPL-MCNC: 18 MG/DL (ref 6–23)
CALCIUM SERPL-MCNC: 9.1 MG/DL (ref 8.6–10.3)
CHLORIDE SERPL-SCNC: 104 MMOL/L (ref 98–107)
CHOLEST SERPL-MCNC: 132 MG/DL (ref 0–199)
CHOLEST/HDLC SERPL: 3.2 {RATIO}
CO2 SERPL-SCNC: 26 MMOL/L (ref 21–32)
CREAT SERPL-MCNC: 1.35 MG/DL (ref 0.5–1.3)
CREAT UR-MCNC: 61.4 MG/DL (ref 20–370)
EGFRCR SERPLBLD CKD-EPI 2021: 54 ML/MIN/1.73M*2
EOSINOPHIL # BLD AUTO: 0.13 X10*3/UL (ref 0–0.4)
EOSINOPHIL NFR BLD AUTO: 2.1 %
ERYTHROCYTE [DISTWIDTH] IN BLOOD BY AUTOMATED COUNT: 12.5 % (ref 11.5–14.5)
EST. AVERAGE GLUCOSE BLD GHB EST-MCNC: 111 MG/DL
GLUCOSE SERPL-MCNC: 92 MG/DL (ref 74–99)
HBA1C MFR BLD: 5.5 %
HCT VFR BLD AUTO: 42.3 % (ref 41–52)
HDLC SERPL-MCNC: 40.7 MG/DL
HGB BLD-MCNC: 13.8 G/DL (ref 13.5–17.5)
IMM GRANULOCYTES # BLD AUTO: 0.03 X10*3/UL (ref 0–0.5)
IMM GRANULOCYTES NFR BLD AUTO: 0.5 % (ref 0–0.9)
LDLC SERPL CALC-MCNC: 49 MG/DL
LYMPHOCYTES # BLD AUTO: 1.85 X10*3/UL (ref 0.8–3)
LYMPHOCYTES NFR BLD AUTO: 30.6 %
MCH RBC QN AUTO: 31.5 PG (ref 26–34)
MCHC RBC AUTO-ENTMCNC: 32.6 G/DL (ref 32–36)
MCV RBC AUTO: 97 FL (ref 80–100)
MICROALBUMIN UR-MCNC: <7 MG/L
MICROALBUMIN/CREAT UR: NORMAL MG/G{CREAT}
MONOCYTES # BLD AUTO: 0.6 X10*3/UL (ref 0.05–0.8)
MONOCYTES NFR BLD AUTO: 9.9 %
NEUTROPHILS # BLD AUTO: 3.41 X10*3/UL (ref 1.6–5.5)
NEUTROPHILS NFR BLD AUTO: 56.4 %
NON HDL CHOLESTEROL: 91 MG/DL (ref 0–149)
NRBC BLD-RTO: 0 /100 WBCS (ref 0–0)
PLATELET # BLD AUTO: 188 X10*3/UL (ref 150–450)
POTASSIUM SERPL-SCNC: 4.6 MMOL/L (ref 3.5–5.3)
PROT SERPL-MCNC: 7 G/DL (ref 6.4–8.2)
RBC # BLD AUTO: 4.38 X10*6/UL (ref 4.5–5.9)
SODIUM SERPL-SCNC: 137 MMOL/L (ref 136–145)
T4 FREE SERPL-MCNC: 0.66 NG/DL (ref 0.61–1.12)
TRIGL SERPL-MCNC: 210 MG/DL (ref 0–149)
TSH SERPL-ACNC: 4.95 MIU/L (ref 0.44–3.98)
VLDL: 42 MG/DL (ref 0–40)
WBC # BLD AUTO: 6.1 X10*3/UL (ref 4.4–11.3)

## 2024-12-09 PROCEDURE — 82570 ASSAY OF URINE CREATININE: CPT

## 2024-12-09 PROCEDURE — 83036 HEMOGLOBIN GLYCOSYLATED A1C: CPT

## 2024-12-09 PROCEDURE — 84439 ASSAY OF FREE THYROXINE: CPT

## 2024-12-09 PROCEDURE — 80061 LIPID PANEL: CPT

## 2024-12-09 PROCEDURE — 85025 COMPLETE CBC W/AUTO DIFF WBC: CPT

## 2024-12-09 PROCEDURE — 84443 ASSAY THYROID STIM HORMONE: CPT

## 2024-12-09 PROCEDURE — 36415 COLL VENOUS BLD VENIPUNCTURE: CPT

## 2024-12-09 PROCEDURE — 80053 COMPREHEN METABOLIC PANEL: CPT

## 2024-12-09 PROCEDURE — 82043 UR ALBUMIN QUANTITATIVE: CPT

## 2024-12-16 ENCOUNTER — APPOINTMENT (OUTPATIENT)
Dept: PRIMARY CARE | Facility: CLINIC | Age: 79
End: 2024-12-16
Payer: MEDICARE

## 2024-12-16 ENCOUNTER — TELEPHONE (OUTPATIENT)
Dept: PRIMARY CARE | Facility: CLINIC | Age: 79
End: 2024-12-16

## 2024-12-16 ENCOUNTER — HOSPITAL ENCOUNTER (OUTPATIENT)
Dept: RADIOLOGY | Facility: CLINIC | Age: 79
Discharge: HOME | End: 2024-12-16
Payer: MEDICARE

## 2024-12-16 VITALS
OXYGEN SATURATION: 98 % | DIASTOLIC BLOOD PRESSURE: 72 MMHG | TEMPERATURE: 98 F | SYSTOLIC BLOOD PRESSURE: 142 MMHG | HEART RATE: 72 BPM | HEIGHT: 66 IN | BODY MASS INDEX: 25.23 KG/M2 | WEIGHT: 157 LBS

## 2024-12-16 DIAGNOSIS — Z00.00 WELL ADULT EXAM: Primary | ICD-10-CM

## 2024-12-16 DIAGNOSIS — E78.2 MIXED HYPERLIPIDEMIA: ICD-10-CM

## 2024-12-16 DIAGNOSIS — M54.6 ACUTE RIGHT-SIDED THORACIC BACK PAIN: ICD-10-CM

## 2024-12-16 DIAGNOSIS — E11.9 TYPE 2 DIABETES MELLITUS WITHOUT COMPLICATION, UNSPECIFIED WHETHER LONG TERM INSULIN USE (MULTI): Primary | ICD-10-CM

## 2024-12-16 DIAGNOSIS — I10 BENIGN ESSENTIAL HTN: ICD-10-CM

## 2024-12-16 DIAGNOSIS — Z71.89 CARDIAC RISK COUNSELING: ICD-10-CM

## 2024-12-16 PROCEDURE — G0446 INTENS BEHAVE THER CARDIO DX: HCPCS | Performed by: NURSE PRACTITIONER

## 2024-12-16 PROCEDURE — G0439 PPPS, SUBSEQ VISIT: HCPCS | Performed by: NURSE PRACTITIONER

## 2024-12-16 PROCEDURE — 3078F DIAST BP <80 MM HG: CPT | Performed by: NURSE PRACTITIONER

## 2024-12-16 PROCEDURE — 3077F SYST BP >= 140 MM HG: CPT | Performed by: NURSE PRACTITIONER

## 2024-12-16 PROCEDURE — 72072 X-RAY EXAM THORAC SPINE 3VWS: CPT

## 2024-12-16 PROCEDURE — 1123F ACP DISCUSS/DSCN MKR DOCD: CPT | Performed by: NURSE PRACTITIONER

## 2024-12-16 PROCEDURE — 72072 X-RAY EXAM THORAC SPINE 3VWS: CPT | Performed by: RADIOLOGY

## 2024-12-16 PROCEDURE — 1160F RVW MEDS BY RX/DR IN RCRD: CPT | Performed by: NURSE PRACTITIONER

## 2024-12-16 PROCEDURE — 99214 OFFICE O/P EST MOD 30 MIN: CPT | Performed by: NURSE PRACTITIONER

## 2024-12-16 PROCEDURE — 1125F AMNT PAIN NOTED PAIN PRSNT: CPT | Performed by: NURSE PRACTITIONER

## 2024-12-16 PROCEDURE — 1159F MED LIST DOCD IN RCRD: CPT | Performed by: NURSE PRACTITIONER

## 2024-12-16 PROCEDURE — 1158F ADVNC CARE PLAN TLK DOCD: CPT | Performed by: NURSE PRACTITIONER

## 2024-12-16 PROCEDURE — 1170F FXNL STATUS ASSESSED: CPT | Performed by: NURSE PRACTITIONER

## 2024-12-16 RX ORDER — AMLODIPINE BESYLATE 5 MG/1
5 TABLET ORAL DAILY
Qty: 30 TABLET | Refills: 5 | Status: SHIPPED | OUTPATIENT
Start: 2024-12-16 | End: 2025-06-14

## 2024-12-16 RX ORDER — ATORVASTATIN CALCIUM 40 MG/1
40 TABLET, FILM COATED ORAL DAILY
Qty: 90 TABLET | Refills: 1 | Status: SHIPPED | OUTPATIENT
Start: 2024-12-16 | End: 2025-12-16

## 2024-12-16 ASSESSMENT — ACTIVITIES OF DAILY LIVING (ADL)
DRESSING: INDEPENDENT
TAKING_MEDICATION: INDEPENDENT
BATHING: INDEPENDENT
DOING_HOUSEWORK: INDEPENDENT
GROCERY_SHOPPING: INDEPENDENT
MANAGING_FINANCES: INDEPENDENT

## 2024-12-16 ASSESSMENT — PAIN SCALES - GENERAL: PAINLEVEL_OUTOF10: 6

## 2024-12-16 NOTE — PROGRESS NOTES
Subjective   Reason for Visit: Pablo Henson is an 79 y.o. male here for a Medicare Wellness visit.     Past Medical, Surgical, and Family History reviewed and updated in chart.         CHRISTOPHER Celaya is a 78 yo M who Presents for Annual Wellness Visit.  PMHx, FMHx, SHx, and Social history reviewed and updated in chart. PHQ2 reviewed. Mini-cog test reviewed. Advanced Care planning reviewed.  Self assessment of Health - fair  Issues with ADLs - none  Issues with IADLs -none  Issues with home safety -none    diet is varied, he does all the cooking  pt is active  former smoker - quit 1993    CHRONIC ISSUES:  DM  CKD  HLD  Bowel obstruction 2019.  Diverticulosis  BPH and incontinence. Had TURP by urology. wears depends due to urinary leakage  Now on vesicare    PSH - Hernia repair 2019    IMMUNIZATION:  Reviewed    COLORECTAL SCREENING  - Last colonoscopy - 2019 Dr Littlejohn  - Next colonoscopy due - Aged out  - Relevant PFHx - None  Has hemorrhoid, uses metamucil    BLOOD PRESSURE SCREEN:  vitals reviewed  Current treatment if any - Lisinopril 20    DIABETES SCREEN:  Diabetic review:  Last A1c - 5.9  Next A1c - 6mo  Current treatment:  - Metformin 500mg daily    LUNG CANCER SCREEN:  55-80yrs with >30pky and quit <15yrs ago  FORMER smoker  Quit - 1993    TOBACCO ABUSE SCREEN:  Updated in histories tab - No concerns    ALCOHOL ABUSE SCREEN:  Updated in histories tab - No concerns  - Occasional    DEPRESSION SCREEN - PHq2:  Over the past 2 weeks, how often have you been bothered by  1 - Little interest or pleasure in doing things -  2 - Feeling down, depressed or hopeless -  (not at all 0, Several days 1, More than half of days 2, Nearly every day 3)    SCREEN HYPERLIPIDEMIA:  Previous treatment - Atorvastatin 20  ASCVD risk at this visit - LDL 56  Aspirin if >10%? -  Significant PFHx - Both parents with CVA (DM and smokers)    SEXUAL HEALTH:  HCV if born [id number] once - Negative 2020  HIV, HBV, VDRL if high risk  -    MALE SPECIFIC:    PROSTATE CANCER SCREENING:  - Last PSA - 3/22  - Previous treatment - seeing urology  - PFx -  - Future plan - Discussed risk of prostate cancer by age discussed risk of finding and treating disease that is unlikely to be fatal. Discussed mortality in screened vs non screened populations. After this discussion we have decided to no longer screen for prostate cancer.    AAA SCREEN:  (65-75yro males who have ever smoked)  advised and will consider    URINARY/:  Nocturia - q1-2hr - recent increase  Frequency - yes  sees urology    Past medical/surgical/family and social histories updated in history tab.    Pt complains of low back pain - using tylenol and biofreeze  Hx of PM with Dr Julio/Dr Gupta - had injections      Patient Care Team:  JENNIFER Ren-CNP as PCP - General  Alphonso Lugo MD     Review of Systems  Constitutional Symptoms: negative for fever, loss of appetite, headaches, fatigue.   Eyes: negative for loss and blurring of vision, double vision.   Ear, Nose, Mouth, Throat: negative for hearing loss, tinnitus, nasal congestion, rhinorrhea, nose bleeds, teeth problems, mouth sores, gum disease, dysphagia, sore throat.   Cardiovascular: negative for chest pain/pressure, palpitations, edema, claudication.   Respiratory: negative for shortness of breath, dyspnea on exertion, pain with breathing, coughing.   Breast: negative for tenderness, masses, gynecomastia.   Gastrointestinal: negative for anorexia, indigestion, nausea, vomiting, abdominal pain, change in bowel habits, diarrhea, constipation, hematochezia, melena, blood in stool.    : Negative for urinary or genital complaints  Musculoskeletal: negative for joint pain, joint swelling, myalgia, cramps.  Positive with her thoracic back pain, worse with movement  Began after shoveling snow  Integumentary: negative for change in mole, skin trouble or rash.   Neurological: negative for headache, numbness, tingling, weakness,  "tremors.   Psychiatric: negative for depression, anxiety.   Endocrine: negative for weight gain, heat or cold intolerance, polyuria, polydipsia, polyphagia.   Hematologic/Lymphatic: negative for bruising, abnormal bleeding, swollen glands     Objective   Vitals:  /72   Pulse 72   Temp 36.7 °C (98 °F)   Ht 1.676 m (5' 6\")   Wt 71.2 kg (157 lb)   SpO2 98%   BMI 25.34 kg/m²       Physical Exam  General Appearance: Vital Signs have been reviewed. Comfortable. Well nourished, and well developed. He is awake, alert, and oriented and appears his stated age. The patient is cooperative with exam.  Head: Hair pattern reveals a normal pattern for patient's age and The face shows no abnormalities.  Eyes: PERRLA, EOMI, conjunctiva and sclera clear. Extraocular muscle exam reveals EOMI.  Ears, Nose, Mouth, and Throat: EARS: External bilateral ears reveals normal helix, tragus and ear lobe. Bilateral canals are normal. Both tympanic membranes are pearly gray and landmarks normal.   NOSE: Nasal mucosa in both nostrils reveals no polyps, ulcerations, or lesions. Teeth reveal good repair. Posterior pharynx reveals no abnormalities.  Neck: Neck reveals supple, no adenopathy, no thyromegaly, or carotid bruits.  Chest: Lungs are clear to auscultation bilaterally with no wheezes, rales, or rhonchi.  Cardiovascular: RRR without MRG. Bilateral DP pulses are 2+. Extremities reveal no cyanosis, clubbing, or edema.  Abdomen: Abdomen with normoactive bowel sounds x4 quads, Abd is soft, NT, ND with no masses.  Musculoskeletal: 5/5 and equal strength in bilateral upper and lower extremities.  Skin: Skin reveals good turgor and no rashes.  Neurological:  Intact and non-focal.  Psychiatric: Patient has appropriate judgement. Patient has good insight. Patient's mood is appropriate.      Assessment & Plan  Acute right-sided thoracic back pain  Patient with right-sided thoracic back pain  Began after shoveling snow  Worse with " movement  Use Tylenol, heat, ice, stretching  AAOS back exercises provided  X-ray ordered, await results       Benign essential HTN  Blood pressure elevated today in office  Needs better control  Add Norvasc 5 mg daily to lisinopril 20 mg daily  Mediterranean diet, no added salt, monitor blood pressure and record  Follow-up 3 months for blood pressure check  Orders:    amLODIPine (Norvasc) 5 mg tablet; Take 1 tablet (5 mg) by mouth once daily.    Mixed hyperlipidemia  The 10-year ASCVD risk score (Cristofer LARSEN, et al., 2019) is: 57%    Values used to calculate the score:      Age: 79 years      Sex: Male      Is Non- : No      Diabetic: Yes      Tobacco smoker: No      Systolic Blood Pressure: 142 mmHg      Is BP treated: Yes      HDL Cholesterol: 40.7 mg/dL      Total Cholesterol: 132 mg/dL  Cardiovascular risks discussed and, if needed, lifestyle modifications recommended, including nutritional choices, exercise, and elimination of habits contributing to risk.  We agreed on a plan to reduce the current cardiovascular risk.  Aspirin use/tissues was discussed after reviewing updated guidelines  Needs better control  Atorvastatin 40 mg will be prescribed to optimize statin coverage  Mediterranean diet, exercise, soluble fiber  Follow-up 3 months  Orders:    atorvastatin (Lipitor) 40 mg tablet; Take 1 tablet (40 mg) by mouth once daily.    Well adult exam  well exam  Preventative screenings reviewed  Immunizations reviewed  Mediterranean diet, exercise, safety  Follow up 3 months for blood pressure and cholesterol check         Cardiac risk counseling                Cardiac Risk Assessment  15 - 20 minutes were spent discussing Cardiovascular risk and, if needed, lifestyle modifications were recommended, including nutritional choices, exercise, and elimination of habits contributing to risk.   Aspirin use/disuse was discussed following the guidelines below:  low dose ASA ( mg) should be  considered:    If prior Heart Attack/Stroke/Peripheral vascular disease:  Generally recommend daily low dose aspirin unless extremely high bleeding risk (e.g., gastrointestinal).    If no prior Heart Attack/Stroke/Peripheral vascular disease:              Age over 70: Do not use Aspirin for prevention    Age less than 70 and 10-year cardiovascular disease risk is >20%: use low dose Aspirin for prevention.

## 2024-12-16 NOTE — PATIENT INSTRUCTIONS
Thank you for choosing our office for your healthcare needs.     Follow the Mediterranean diet - choosing whole foods, vegetables, lean proteins.   It is recommended to get 150 minutes of exercise per week - you can do this in short intervals or 30 minutes 5x per week.    Stop Metformin to help protect your kidneys     Increase your atorvastatin to 40mg daily    Your blood pressure is elevated - please start amlodipine 5 mg daily and monitor your blood pressure  Continue your lisinopril dosing

## 2024-12-16 NOTE — ASSESSMENT & PLAN NOTE
The 10-year ASCVD risk score (Cristofer LARSEN, et al., 2019) is: 57%    Values used to calculate the score:      Age: 79 years      Sex: Male      Is Non- : No      Diabetic: Yes      Tobacco smoker: No      Systolic Blood Pressure: 142 mmHg      Is BP treated: Yes      HDL Cholesterol: 40.7 mg/dL      Total Cholesterol: 132 mg/dL  Cardiovascular risks discussed and, if needed, lifestyle modifications recommended, including nutritional choices, exercise, and elimination of habits contributing to risk.  We agreed on a plan to reduce the current cardiovascular risk.  Aspirin use/tissues was discussed after reviewing updated guidelines  Needs better control  Atorvastatin 40 mg will be prescribed to optimize statin coverage  Mediterranean diet, exercise, soluble fiber  Follow-up 3 months  Orders:    atorvastatin (Lipitor) 40 mg tablet; Take 1 tablet (40 mg) by mouth once daily.

## 2024-12-16 NOTE — ASSESSMENT & PLAN NOTE
Blood pressure elevated today in office  Needs better control  Add Norvasc 5 mg daily to lisinopril 20 mg daily  Mediterranean diet, no added salt, monitor blood pressure and record  Follow-up 3 months for blood pressure check  Orders:    amLODIPine (Norvasc) 5 mg tablet; Take 1 tablet (5 mg) by mouth once daily.

## 2024-12-19 ENCOUNTER — TELEPHONE (OUTPATIENT)
Dept: PRIMARY CARE | Facility: CLINIC | Age: 79
End: 2024-12-19
Payer: MEDICARE

## 2024-12-19 NOTE — TELEPHONE ENCOUNTER
ARNIE    Pt is calling for x ray results. He does not use my chart . Pt was made aware that ARNIE is no longer here.  [phone].

## 2025-01-03 ENCOUNTER — APPOINTMENT (OUTPATIENT)
Dept: PRIMARY CARE | Facility: CLINIC | Age: 80
End: 2025-01-03
Payer: MEDICARE

## 2025-01-03 ENCOUNTER — TELEPHONE (OUTPATIENT)
Dept: PRIMARY CARE | Facility: CLINIC | Age: 80
End: 2025-01-03

## 2025-01-03 VITALS
HEART RATE: 59 BPM | DIASTOLIC BLOOD PRESSURE: 66 MMHG | WEIGHT: 157 LBS | BODY MASS INDEX: 25.34 KG/M2 | OXYGEN SATURATION: 98 % | SYSTOLIC BLOOD PRESSURE: 118 MMHG

## 2025-01-03 DIAGNOSIS — G89.29 CHRONIC MIDLINE LOW BACK PAIN WITHOUT SCIATICA: Primary | ICD-10-CM

## 2025-01-03 DIAGNOSIS — M54.50 CHRONIC MIDLINE LOW BACK PAIN WITHOUT SCIATICA: Primary | ICD-10-CM

## 2025-01-03 DIAGNOSIS — M54.6 THORACIC SPINE PAIN: ICD-10-CM

## 2025-01-03 DIAGNOSIS — E11.9 TYPE 2 DIABETES MELLITUS WITHOUT COMPLICATION, UNSPECIFIED WHETHER LONG TERM INSULIN USE (MULTI): ICD-10-CM

## 2025-01-03 PROCEDURE — 1125F AMNT PAIN NOTED PAIN PRSNT: CPT | Performed by: PHYSICIAN ASSISTANT

## 2025-01-03 PROCEDURE — 1160F RVW MEDS BY RX/DR IN RCRD: CPT | Performed by: PHYSICIAN ASSISTANT

## 2025-01-03 PROCEDURE — 1123F ACP DISCUSS/DSCN MKR DOCD: CPT | Performed by: PHYSICIAN ASSISTANT

## 2025-01-03 PROCEDURE — 99212 OFFICE O/P EST SF 10 MIN: CPT | Performed by: PHYSICIAN ASSISTANT

## 2025-01-03 PROCEDURE — 3074F SYST BP LT 130 MM HG: CPT | Performed by: PHYSICIAN ASSISTANT

## 2025-01-03 PROCEDURE — 3078F DIAST BP <80 MM HG: CPT | Performed by: PHYSICIAN ASSISTANT

## 2025-01-03 PROCEDURE — G2211 COMPLEX E/M VISIT ADD ON: HCPCS | Performed by: PHYSICIAN ASSISTANT

## 2025-01-03 PROCEDURE — 1159F MED LIST DOCD IN RCRD: CPT | Performed by: PHYSICIAN ASSISTANT

## 2025-01-03 ASSESSMENT — PAIN SCALES - GENERAL: PAINLEVEL_OUTOF10: 4

## 2025-01-03 ASSESSMENT — ENCOUNTER SYMPTOMS: BACK PAIN: 1

## 2025-01-03 NOTE — PROGRESS NOTES
Subjective   Patient ID: Pablo Henson is a 79 y.o. male who presents for Back Pain (Mid to lower back pain for about 6 weeks. No injury. ).    Back Pain  This is a recurrent problem. The current episode started more than 1 month ago. The problem has been waxing and waning since onset. The pain is present in the thoracic spine and lumbar spine. The pain is mild. The pain is The same all the time. Pertinent negatives include no bladder incontinence, bowel incontinence, paresthesias, perianal numbness, tingling or weakness. He has tried heat and home exercises for the symptoms.        Review of Systems   Gastrointestinal:  Negative for bowel incontinence.   Genitourinary:  Negative for bladder incontinence.   Musculoskeletal:  Positive for back pain.   Neurological:  Negative for tingling, weakness and paresthesias.       Objective   /66   Pulse 59   Wt 71.2 kg (157 lb)   SpO2 98%   BMI 25.34 kg/m²     Physical Exam  Musculoskeletal:      Thoracic back: Tenderness and bony tenderness present. Normal range of motion. No scoliosis.      Lumbar back: Tenderness and bony tenderness present. Normal range of motion.   Neurological:      General: No focal deficit present.      Mental Status: He is alert and oriented to person, place, and time. Mental status is at baseline.      Deep Tendon Reflexes:      Reflex Scores:       Patellar reflexes are 2+ on the right side and 2+ on the left side.       Achilles reflexes are 2+ on the right side and 2+ on the left side.        Assessment/Plan   Diagnoses and all orders for this visit:  Chronic midline low back pain without sciatica  -     Referral to Physical Therapy; Future  Thoracic spine pain  -     Referral to Physical Therapy; Future  Other orders  -     Follow Up In Primary Care - Established; Future  Will refer to physical therapy. Use heat and continue stretches.

## 2025-01-06 ASSESSMENT — ENCOUNTER SYMPTOMS
TINGLING: 0
PARESTHESIAS: 0
PERIANAL NUMBNESS: 0
WEAKNESS: 0
BOWEL INCONTINENCE: 0

## 2025-01-08 NOTE — PROGRESS NOTES
Physical Therapy Evaluation/Treatment    Patient Name: Pablo Henson  MRN: [id number]  Encounter Date: 1/27/2025  Time Calculation  Start Time: 1330  Stop Time: 1410  Time Calculation (min): 40 min    Visit Number:  1/12 (including evaluation)  Planned total visits: 12  Visit Authorized/insurance considerations:  01/23/2025: MEDICARE A/B, [employer], [place], NO AUTH, ( $0 USED PT/ST)   Progress Report due visit #10    Current Problem:  Problem List Items Addressed This Visit    None  Visit Diagnoses         Codes    Chronic midline low back pain without sciatica     M54.50, G89.29    Relevant Orders    Follow Up In Physical Therapy    Thoracic spine pain     M54.6    Relevant Orders    Follow Up In Physical Therapy            Subjective:  Subjective     SUBJECTIVE:  Main complaint:  mid back pain which began in November.  May have been overdoing his housework.  Is feeling better but is fearful the pain will come back. He is also is concerned about the curvature in his spine  Onset Date:  Rx:  01/03/2025;    Date of Injury:  NA    Patient reported hx of injury:   Nothing specifically but was getting ready for the holidays    What aggravates symptoms:     Movement, twisting, sitting    What improves symptoms:     Lying flat  Heat moisture    Previous Medical Treatment:    epidurals    Relevant PMH:  DM - controlled  Bladder incontinence  H/o double hernia has his some groin pain on and off since    Red flags:  Bladder changes or incontinence    Imaging:  X Ray    XR thoracic spine 3 views    Result Date: 12/17/2024  Interpreted By:  Gunner Crocker, STUDY: XR THORACIC SPINE 3 VIEWS; ;  12/16/2024 2:29 pm   INDICATION: Signs/Symptoms:right thoracic back pain.   ,M54.6 Pain in thoracic spine   COMPARISON: None.   ACCESSION NUMBER(S): [id number]   ORDERING CLINICIAN: TEDDY KIDD       FINDINGS: Thoracic spine, three views   There is no fracture. There is no spondylolisthesis. There is no disc space narrowing. There  is mild osteophytosis in the mid to lower thoracic spine. The prevertebral soft tissues are within normal limits..         Mild multilevel spondylotic changes with osteophyte formation. No acute abnormality seen       MACRO: None   Signed by: Gunner Crocker 12/17/2024 6:22 PM Dictation workstation:   [id number]       Previous Therapy Treatments:    N/A    Pt stated Goal:    Doesn't want recurrent of his symptoms  Want to perform heavier activities at home and not have the symptoms return    General:  General  Reason for Referral: midback pain    Precautions:  Precautions  Precautions Comment: h/o double hernia    Pain:  Pain Assessment: 0-10  0-10 (Numeric) Pain Score: 1 (past week 1)  Pain Type: Chronic pain  Pain Location: Back  Pain Orientation: Mid  Pain Radiating Towards: radiates downward  Pain Descriptors: Burning  Pain Frequency: Intermittent    Home Living:  Home Living Comment: yes    Home type: Condo  Stairs: Yes  Lives with: Spouse    Vocation:    Retired  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    Prior Function Per Pt/Caregiver Report:  Level of [place]: Independent with ADLs and functional transfers, Independent with homemaking with ambulation    OBJECTIVE:    Posture:  Posture Comment: minimal scoliosis, mod kyphosis      ROM and Strength:    Cervical:    AROM:  WFL    Shoulder:    AROM:  WFL     STRENGTH   Shoulder R L   Flexion 4+/5 4+/5   Extension 5/5 5/5   Abduction 4/5 4/5   Internal  Rotation 4+/5 4+/5   External Rotation 4+/5 4+/5     Elbow:    Strength:  WFL and AROM:  WFL    See below    Lumbar AROM STRENGTH    Flexion 25% limited* Fair    Extension 25% Fair    /////////////////////////////////////////////////////////////    AROM STRENGTH    R L R L   Rotation 50% limited 50%   limited Fair Fair=   Sidebend WFL WFL fair+ fair     *pt with some lower back pain today and sitting is painful >15-20 minutes    Hip:    See below       AROM STRENGTH   Hip R L R L   Hip Flexion 70 40 *  4+/5 3-/5   Hip Abduction WFL WFL 4+/5 4/5   Hip Adduction WFL WFL 4+/5 4/5     *c/o pain and weakness    Knee:    AROM:  WFL     STRENGTH   Knee R L   Knee Flexion 5/5 4+/5   Knee Extension 5/5 4+/5     Flexibility:       R  L   Pectoralis tight tight   Piriformis tight Tight (modified position due to left groin pain   Hamstring Very tight Very tight      Palpation:  Palpation Comment: TTP Mid thoracic PSP to L4    Gait:  Gait Comment: flexed posturing, kyphosos, WBOS    Transfers:  Transfers Comment: Used UE A    Outcome Measures:  Other Measures  Oswestry Disablity Index (HERB): 24     Assessment  PT Assessment Results: Decreased strength, Decreased range of motion, Decreased mobility, Pain  Rehab Prognosis: Good  Assessment Comment: Despite pt indicating he was not in much pain, He was unable to sit for more than a few minutes and needed to stand frequently.  He had difficulty maintaining any one position.  Pt limits his bending forward because he didn't want the pain to worsen.    Pt is a 79 y.o. male who presents with thoracic and low back pain.  Pt would benefit from skilled physical therapy to improve flexibility, ROM, strength to reduce pain and improve the patient's current level of functioning including routine exercise program.  Pt would also benefit from proper body mechanics and ergonomic training to better manage the patient's symptoms and reduce exacerbation.      Pt's recovery time and progress/outcomes will likely be impacted by the patient;s history of kyphosis and left groin/pain since his hernia surgery.    Based on the history including personal factors and/or comorbidities, examination of body systems including body structures and function, activity limitations, and/or participation restrictions, as well as clinical presentation, patient meets criteria for low complexity evaluation.    Plan  Treatment/Interventions: Education/ Instruction, Electrical stimulation, Manual therapy, Neuromuscular  re-education, Therapeutic activities, Therapeutic exercises, Taping techniques  PT Plan: Skilled PT  PT Frequency: 2 times per week  Duration: 12 week cert period  Onset Date: 01/03/25  Certification Period Start Date: 01/27/25  Certification Period End Date: 03/13/25  Number of Treatments Authorized: 12  Rehab Potential: Good  Plan of Care Agreement: Patient    OP EDUCATION:  Outpatient Education  Individual(s) Educated: Patient  Education Provided: Anatomy, Body Mechanics, Physiology, POC, Posture  Plan of Care Discussed and Agreed Upon: yes  Patient Response to Education: Patient/Caregiver Verbalized Understanding of Information    Goals:  Active       PT Problem - Lx, thoracic       PT Goal 1 - STG       Start:  01/27/25    Expected End:  03/13/25       1.  Improve shoulder strength to 4+/5 at deficits  2.  Improve sitting posture with correct alignment of Cx region and shoulders  3.  Improve bilateral LE AROM 60 degrees flexion at deficits  4.  Improve LE strength to 4/5 or better at deficits  5.  Improve bilateral hamstring length to 80% of WFL  6.  Improve trunk flexibility to 75% of WFL  7.  Improve trunk strength [place] Fair+/Good-  8.  Pain:  0 to 1.   9.  Functional Outcome Measure:  WOMAC 12             PT Goal 2 - LT FUNCTIONAL GOALS       Start:  01/27/25    Expected End:  04/27/25       LONG TERM FUNCTIONAL GOALS  1.  Pt able to feel comfortable bending forward for IADLs  2. Pt able to vacuum and performing heavier IADLs 75% of the time with minimal to no pain  3. Pt able to sit for up to 10 minutes 75% of the time without needing to stand due to pain.  4. Pt able to stand for up to 10 minutes 80% of the time without needing to sit due to pain             Patient Stated Goal 1       Start:  01/27/25    Expected End:  04/27/25       Doesn't want recurrent of his symptoms  Want to perform heavier activities at home and not have the symptoms return             Treatments this date:  OP PT EDUCATION:       Anatomy;  kyphosis, scoliosis, vertebral position, disc bulges, purpose of Cx and Lx curvature.    Initiated postural re-ed with sitting tall posture  Pt inquired about a back brace to help with his posture which I felt was not needed at this time  Rationale for PT POC    Billed Treatment Times:  Therapeutic Activity 10 min    Plan for next visit:  trunk stretches and strengthening, LE strengthening and stretches, Initiate DLS.  continue with postural re-ed,, add DLS, manual.

## 2025-01-14 DIAGNOSIS — I10 BENIGN ESSENTIAL HTN: ICD-10-CM

## 2025-01-14 DIAGNOSIS — E78.2 MIXED HYPERLIPIDEMIA: ICD-10-CM

## 2025-01-14 RX ORDER — ATORVASTATIN CALCIUM 20 MG/1
20 TABLET, FILM COATED ORAL DAILY
Qty: 90 TABLET | Refills: 3 | OUTPATIENT
Start: 2025-01-14

## 2025-01-14 RX ORDER — LISINOPRIL 20 MG/1
20 TABLET ORAL DAILY
Qty: 90 TABLET | Refills: 3 | Status: SHIPPED | OUTPATIENT
Start: 2025-01-14

## 2025-01-27 ENCOUNTER — EVALUATION (OUTPATIENT)
Dept: PHYSICAL THERAPY | Facility: CLINIC | Age: 80
End: 2025-01-27
Payer: MEDICARE

## 2025-01-27 DIAGNOSIS — G89.29 CHRONIC MIDLINE LOW BACK PAIN WITHOUT SCIATICA: ICD-10-CM

## 2025-01-27 DIAGNOSIS — M54.6 THORACIC SPINE PAIN: ICD-10-CM

## 2025-01-27 DIAGNOSIS — M54.50 CHRONIC MIDLINE LOW BACK PAIN WITHOUT SCIATICA: ICD-10-CM

## 2025-01-27 PROCEDURE — 97161 PT EVAL LOW COMPLEX 20 MIN: CPT | Mod: GP | Performed by: PHYSICAL THERAPIST

## 2025-01-27 PROCEDURE — 97530 THERAPEUTIC ACTIVITIES: CPT | Mod: GP | Performed by: PHYSICAL THERAPIST

## 2025-01-27 ASSESSMENT — PAIN - FUNCTIONAL ASSESSMENT: PAIN_FUNCTIONAL_ASSESSMENT: 0-10

## 2025-01-27 ASSESSMENT — PAIN SCALES - GENERAL: PAINLEVEL_OUTOF10: 1

## 2025-01-27 ASSESSMENT — PAIN DESCRIPTION - DESCRIPTORS: DESCRIPTORS: BURNING

## 2025-01-29 NOTE — PROGRESS NOTES
"    Physical Therapy Treatment    Patient Name: Pablo Henson  MRN: [id number]  Encounter date:  1/30/2025  Time Calculation  Start Time: 1315  Stop Time: 1359  Time Calculation (min): 44 min     PT Therapeutic Procedures Time Entry  Manual Therapy Time Entry: 10  Therapeutic Exercise Time Entry: 30       Visit Number:  2 (including evaluation)  Planned total visits: 12  Visits Authorized/Insurance Coverage:  : MEDICARE A/B, [place], [place], NO AUTH, ( $0 USED PT/ST)   PROGRESS REPORT NEEDED AT VISIT #10    Current Problem  Problem List Items Addressed This Visit    None  Visit Diagnoses         Codes    Chronic midline low back pain without sciatica     M54.50, G89.29    Thoracic spine pain     M54.6          Precautions  Relevant PMH:  DM - controlled  Bladder incontinence  H/o double hernia has his some groin pain on and off since     Red flags:  Bladder changes or incontinence     Pain  3/10    Subjective  General      Patient states he has been active in ADLs \"helping my wife around the house\", noting he has been performing the stretches provided at the initial evaluation.     Objective    TTP in LEFT and RIGHT lumbar musculature.     Treatment:  Manual:  STM to lumbar region, pt seated leaning forward onto table, w/ deep prep     Therapeutic exercise:       Supine:       TrA bracing x 10, 5\" hold       Posterior pelvic tilt x 10, 5\" hold       DKTC on stability ball x 10- anterior LLE pain         LTR on stability ball x 10 ea        Seated:      Push outs w/ TrA bracing x 10      OH press x 10      Seated posture corrections 10 x 10\"- \"popping\"      Hamstring stretches 2 x 20\" ea       Current HEP:  Access Code: [email or web address]  URL: [email or web address]  Date: 01/30/2025  Prepared by: Jeremy Jansen    Exercises  - Supine Transversus Abdominis Bracing - Hands on Stomach  - 1 x daily - 7 x weekly - 3 sets - 10 reps - 5 second hold hold  - Supine Posterior Pelvic Tilt  - 1 x daily - 7 x weekly - 3 sets - 10 reps " - 5 second  hold  - Supine Single Knee to Chest Stretch  - 1 x daily - 7 x weekly - 3 sets - 20-30 second hold  - Supine Lower Trunk Rotation  - 1 x daily - 7 x weekly - 3 sets - 10 reps  - Seated Hip Abduction with Resistance  - 1 x daily - 7 x weekly - 3 sets - 10 reps  - Seated Hip Adduction Isometrics with Ball  - 1 x daily - 7 x weekly - 3 sets - 10 reps  - Seated Long Arc Quad  - 1 x daily - 7 x weekly - 3 sets - 10 reps  - Standing Alternating Knee Flexion  - 1 x daily - 7 x weekly - 3 sets - 10 reps  - Correct Seated Posture  - 1 x daily - 7 x weekly - 3 sets - 10 reps  - Seated Hamstring Stretch  - 1 x daily - 7 x weekly - 3 reps - 20-30 seconds hold  - Seated Overhead Press with Dumbbells  - 1 x daily - 7 x weekly - 3 sets - 10 reps  - Seated Chest Press  - 1 x daily - 7 x weekly - 3 sets - 10 reps    Has patient been compliant with HEP? Yes    Activity tolerance:  Good    OP EDUCATION:  Pt was provided a handout of the HEP and theraband for seated hip abduction    Assessment:    Pt's response to treatment:  Good response to therex introduced today verbally and visually. LLE hip flexor and upper quad pain with DKTC and seated hip adduction, but able to perform therex w/o stopping. Mild reduction in low back pain following treatment.     Areas of improvements: Decreased low back pain following STM, increased exercise endurance   Limitations/deficits:  Increased low back pain with ADLs     Pain end of session: 2/10    Plan:    Continue with current POC/no changes    Assessment of current progress against goals:  Symptomatic relief with treatment and Insufficient treatment time to assess progress    Goals:  Active       PT Problem - Lx, thoracic       PT Goal 1 - STG       Start:  01/27/25    Expected End:  03/13/25       1.  Improve shoulder strength to 4+/5 at deficits  2.  Improve sitting posture with correct alignment of Cx region and shoulders  3.  Improve bilateral LE AROM 60 degrees flexion at  deficits  4.  Improve LE strength to 4/5 or better at deficits  5.  Improve bilateral hamstring length to 80% of WFL  6.  Improve trunk flexibility to 75% of WFL  7.  Improve trunk strength [place] Fair+/Good-  8.  Pain:  0 to 1.   9.  Functional Outcome Measure:  WOMAC 12             PT Goal 2 - LT FUNCTIONAL GOALS       Start:  01/27/25    Expected End:  04/27/25       LONG TERM FUNCTIONAL GOALS  1.  Pt able to feel comfortable bending forward for IADLs  2. Pt able to vacuum and performing heavier IADLs 75% of the time with minimal to no pain  3. Pt able to sit for up to 10 minutes 75% of the time without needing to stand due to pain.  4. Pt able to stand for up to 10 minutes 80% of the time without needing to sit due to pain             Patient Stated Goal 1       Start:  01/27/25    Expected End:  04/27/25       Doesn't want recurrent of his symptoms  Want to perform heavier activities at home and not have the symptoms return

## 2025-01-29 NOTE — PROGRESS NOTES
Physical Therapy Treatment    Patient Name: Pablo Henson  MRN: [id number]  Encounter date:  1/30/2025                Visit Number:  2 (including evaluation)  Planned total visits: ***  Visits Authorized/Insurance Coverage:  ***    Current Problem  Problem List Items Addressed This Visit    None      Surgery  ***    Surgery date:  ***    Precautions       Pain       Subjective  General        ***    Objective  ***    Treatment:  {PT Treatments:60535}  ***    Aquatic Therapy:        Current HEP:  ***    Has patient been compliant with HEP? {Yes/No:63740}    Activity tolerance:  {Activity:40225}    OP EDUCATION:       Assessment:     Pt's response to treatment:  ***  Areas of improvements:  ***  Limitations/deficits:  ***    Pain end of session: ***    Plan:     {BASPLAN:01322}    Assessment of current progress against goals:  {BASPTNOTEGOALASSESSMENT:84232}    Goals:  Active       PT Problem - Lx, thoracic       PT Goal 1 - STG       Start:  01/27/25    Expected End:  03/13/25       1.  Improve shoulder strength to 4+/5 at deficits  2.  Improve sitting posture with correct alignment of Cx region and shoulders  3.  Improve bilateral LE AROM 60 degrees flexion at deficits  4.  Improve LE strength to 4/5 or better at deficits  5.  Improve bilateral hamstring length to 80% of WFL  6.  Improve trunk flexibility to 75% of WFL  7.  Improve trunk strength [place] Fair+/Good-  8.  Pain:  0 to 1.   9.  Functional Outcome Measure:  WOMAC 12             PT Goal 2 - LT FUNCTIONAL GOALS       Start:  01/27/25    Expected End:  04/27/25       LONG TERM FUNCTIONAL GOALS  1.  Pt able to feel comfortable bending forward for IADLs  2. Pt able to vacuum and performing heavier IADLs 75% of the time with minimal to no pain  3. Pt able to sit for up to 10 minutes 75% of the time without needing to stand due to pain.  4. Pt able to stand for up to 10 minutes 80% of the time without needing to sit due to pain             Patient Stated  Goal 1       Start:  01/27/25    Expected End:  04/27/25       Doesn't want recurrent of his symptoms  Want to perform heavier activities at home and not have the symptoms return

## 2025-01-30 ENCOUNTER — TREATMENT (OUTPATIENT)
Dept: PHYSICAL THERAPY | Facility: CLINIC | Age: 80
End: 2025-01-30
Payer: MEDICARE

## 2025-01-30 DIAGNOSIS — G89.29 CHRONIC MIDLINE LOW BACK PAIN WITHOUT SCIATICA: ICD-10-CM

## 2025-01-30 DIAGNOSIS — M54.50 CHRONIC MIDLINE LOW BACK PAIN WITHOUT SCIATICA: ICD-10-CM

## 2025-01-30 DIAGNOSIS — M54.6 THORACIC SPINE PAIN: ICD-10-CM

## 2025-01-30 PROCEDURE — 97110 THERAPEUTIC EXERCISES: CPT | Mod: CQ,GP | Performed by: SPECIALIST/TECHNOLOGIST

## 2025-01-30 PROCEDURE — 97140 MANUAL THERAPY 1/> REGIONS: CPT | Mod: CQ,GP | Performed by: SPECIALIST/TECHNOLOGIST

## 2025-02-03 NOTE — PROGRESS NOTES
"    Physical Therapy Treatment    Patient Name: Pablo Henson  MRN: [id number]  Encounter date:  2/5/2025  Time Calculation  Start Time: 1315  Stop Time: 1400  Time Calculation (min): 45 min     PT Therapeutic Procedures Time Entry  Manual Therapy Time Entry: 10  Therapeutic Exercise Time Entry: 30       Visit Number:  3 (including evaluation)  Planned total visits: 12  Visits Authorized/Insurance Coverage:  : MEDICARE A/B, [place], [place] AUTH, ( $0 USED PT/ST)   PROGRESS REPORT NEEDED AT VISIT #10    Current Problem  Problem List Items Addressed This Visit    None  Visit Diagnoses         Codes    Chronic midline low back pain without sciatica     M54.50, G89.29    Thoracic spine pain     M54.6          Precautions  Relevant PMH:  DM - controlled  Bladder incontinence  H/o double hernia has his some groin pain on and off since     Red flags:  Bladder changes or incontinence     Pain  5/10    Subjective  General      Patient reports he has been compliant with the HEP, but has had issues completing all the exercises at the same time. Patient notes he has not been doing much outside of his usual ADLs     Objective  LLE and low back muscles tighter than R.     Treatment:  Manual:  STM to lumbar region, pt seated leaning forward onto table, w/ deep prep     Therapeutic exercise:       Supine:       TrA bracing x 10, 5\" hold       Posterior pelvic tilt x 10, 5\" hold- Fair form, cueing needed        DKTC on stability ball x 10-  posterior BLE stretch         LTR on stability ball x 10 ea        Seated:      Push outs w/ TrA bracing x 20, purple ball      OH press x 20, purple ball      Seated posture corrections 10 x 10\"- \"popping\"      Hamstring stretches 2 x 20\" ea      Hip Adduction x 10, 5\" holds         Hip abduction x 10, 5\" holds    Current HEP:  Access Code: [email or web address]  URL: [email or web address]  Date: 01/30/2025  Prepared by: Jeremy Jansen    Exercises  - Supine Transversus Abdominis Bracing - Hands on " Stomach  - 1 x daily - 7 x weekly - 3 sets - 10 reps - 5 second hold hold  - Supine Posterior Pelvic Tilt  - 1 x daily - 7 x weekly - 3 sets - 10 reps - 5 second  hold  - Supine Single Knee to Chest Stretch  - 1 x daily - 7 x weekly - 3 sets - 20-30 second hold  - Supine Lower Trunk Rotation  - 1 x daily - 7 x weekly - 3 sets - 10 reps  - Seated Hip Abduction with Resistance  - 1 x daily - 7 x weekly - 3 sets - 10 reps  - Seated Hip Adduction Isometrics with Ball  - 1 x daily - 7 x weekly - 3 sets - 10 reps  - Seated Long Arc Quad  - 1 x daily - 7 x weekly - 3 sets - 10 reps  - Standing Alternating Knee Flexion  - 1 x daily - 7 x weekly - 3 sets - 10 reps  - Correct Seated Posture  - 1 x daily - 7 x weekly - 3 sets - 10 reps  - Seated Hamstring Stretch  - 1 x daily - 7 x weekly - 3 reps - 20-30 seconds hold  - Seated Overhead Press with Dumbbells  - 1 x daily - 7 x weekly - 3 sets - 10 reps  - Seated Chest Press  - 1 x daily - 7 x weekly - 3 sets - 10 reps    Has patient been compliant with HEP? Yes    Activity tolerance:  Good    OP EDUCATION:  Patient was advised he could perform his HEP therex when it was convenient throughout the day and not necessarily all at once, pt verbalized understanding.     Assessment:    Pt's response to treatment:  Good response to STM to upper lumbar/ mid thoracic paraspinals. Posterior BLE leg stretch pain on DKTC on stability ball. Cueing provided to correct therex performance of seated posture corrections, pt was looking toward the ceiling as he thought the HEP handout instructed. Good response to seated hip adduction/ abduction w/ TrA bracing.  Decreased low back pain at end of treatment.     Areas of improvements: Decreased low back pain following STM, improved exercise performance w/ cueing  Limitations/deficits:  Increased low back pain with ADLs     Pain end of session: 2/10    Plan:    Continue with current POC/no changes    Assessment of current progress against  goals:  Symptomatic relief with treatment and Insufficient treatment time to assess progress    Goals:  Active       PT Problem - Lx, thoracic       PT Goal 1 - STG       Start:  01/27/25    Expected End:  03/13/25       1.  Improve shoulder strength to 4+/5 at deficits  2.  Improve sitting posture with correct alignment of Cx region and shoulders  3.  Improve bilateral LE AROM 60 degrees flexion at deficits  4.  Improve LE strength to 4/5 or better at deficits  5.  Improve bilateral hamstring length to 80% of WFL  6.  Improve trunk flexibility to 75% of WFL  7.  Improve trunk strength [place] Fair+/Good-  8.  Pain:  0 to 1.   9.  Functional Outcome Measure:  WOMAC 12             PT Goal 2 - LT FUNCTIONAL GOALS       Start:  01/27/25    Expected End:  04/27/25       LONG TERM FUNCTIONAL GOALS  1.  Pt able to feel comfortable bending forward for IADLs  2. Pt able to vacuum and performing heavier IADLs 75% of the time with minimal to no pain  3. Pt able to sit for up to 10 minutes 75% of the time without needing to stand due to pain.  4. Pt able to stand for up to 10 minutes 80% of the time without needing to sit due to pain             Patient Stated Goal 1       Start:  01/27/25    Expected End:  04/27/25       Doesn't want recurrent of his symptoms  Want to perform heavier activities at home and not have the symptoms return

## 2025-02-05 ENCOUNTER — TREATMENT (OUTPATIENT)
Dept: PHYSICAL THERAPY | Facility: CLINIC | Age: 80
End: 2025-02-05
Payer: MEDICARE

## 2025-02-05 DIAGNOSIS — M54.6 THORACIC SPINE PAIN: ICD-10-CM

## 2025-02-05 DIAGNOSIS — M54.50 CHRONIC MIDLINE LOW BACK PAIN WITHOUT SCIATICA: ICD-10-CM

## 2025-02-05 DIAGNOSIS — G89.29 CHRONIC MIDLINE LOW BACK PAIN WITHOUT SCIATICA: ICD-10-CM

## 2025-02-05 PROCEDURE — 97140 MANUAL THERAPY 1/> REGIONS: CPT | Mod: CQ,GP | Performed by: SPECIALIST/TECHNOLOGIST

## 2025-02-05 PROCEDURE — 97110 THERAPEUTIC EXERCISES: CPT | Mod: CQ,GP | Performed by: SPECIALIST/TECHNOLOGIST

## 2025-02-06 NOTE — PROGRESS NOTES
"    Physical Therapy Treatment    Patient Name: Pablo Henson  MRN: [id number]  Encounter date:  2/7/2025  Time Calculation  Start Time: 1515  Stop Time: 1559  Time Calculation (min): 44 min     PT Therapeutic Procedures Time Entry  Manual Therapy Time Entry: 10  Therapeutic Exercise Time Entry: 30       Visit Number:  4 (including evaluation)  Planned total visits: 12  Visits Authorized/Insurance Coverage:  : MEDICARE A/B, [place], NO AUTH, ( $0 USED PT/ST)   PROGRESS REPORT NEEDED AT VISIT #10    Current Problem  Problem List Items Addressed This Visit    None  Visit Diagnoses         Codes    Chronic midline low back pain without sciatica     M54.50, G89.29    Thoracic spine pain     M54.6          Precautions  Relevant PMH:  DM - controlled  Bladder incontinence  H/o double hernia has his some groin pain on and off since     Red flags:  Bladder changes or incontinence     Pain  4/10    Subjective  General      Patient states he has been performing ADLs as he is able, noting forward leaning activities like changing the sheets on the bed can cause increased low back pain. Patient reports he has been compliant with his HEP, performing them as he is able throughout the day, not trying to get them done all at once which he found difficult to fit into the schedule.     Objective  LLE and low back muscles tighter than R, TTP in L lower lumbar region.      Treatment:  Manual:  STM to lumbar region, pt seated leaning forward onto table, w/ deep prep     Therapeutic exercise:       Supine:       TrA bracing x 10, 5\" hold       Posterior pelvic tilt x 10, 5\" hold- Improved form, cueing needed        DKTC on stability ball x 20-  posterior BLE stretch         LTR on stability ball x 20 ea        Seated:      Push outs w/ TrA bracing x 20, yellow ball- pain in L pectoralis region      OH press x 20, purple ball      Seated posture corrections 10 x 10\"      Hamstring stretches 2 x 20\" ea      Hip Adduction x 20, 5\" " holds,           Hip abduction x 20, lime green TB     Standing:      Forward and retro shoulder rolls x 10 ea     Ball on wall posterior muscle rollout x 3'    Current HEP:  Access Code: [email or web address]  URL: [email or web address]  Date: 01/30/2025  Prepared by: Jeremy Jansen    Exercises  - Supine Transversus Abdominis Bracing - Hands on Stomach  - 1 x daily - 7 x weekly - 3 sets - 10 reps - 5 second hold hold  - Supine Posterior Pelvic Tilt  - 1 x daily - 7 x weekly - 3 sets - 10 reps - 5 second  hold  - Supine Single Knee to Chest Stretch  - 1 x daily - 7 x weekly - 3 sets - 20-30 second hold  - Supine Lower Trunk Rotation  - 1 x daily - 7 x weekly - 3 sets - 10 reps  - Seated Hip Abduction with Resistance  - 1 x daily - 7 x weekly - 3 sets - 10 reps  - Seated Hip Adduction Isometrics with Ball  - 1 x daily - 7 x weekly - 3 sets - 10 reps  - Seated Long Arc Quad  - 1 x daily - 7 x weekly - 3 sets - 10 reps  - Standing Alternating Knee Flexion  - 1 x daily - 7 x weekly - 3 sets - 10 reps  - Correct Seated Posture  - 1 x daily - 7 x weekly - 3 sets - 10 reps  - Seated Hamstring Stretch  - 1 x daily - 7 x weekly - 3 reps - 20-30 seconds hold  - Seated Overhead Press with Dumbbells  - 1 x daily - 7 x weekly - 3 sets - 10 reps  - Seated Chest Press  - 1 x daily - 7 x weekly - 3 sets - 10 reps    Has patient been compliant with HEP? Yes    Activity tolerance:  Good    OP EDUCATION:    Assessment:    Pt's response to treatment: Decreased lumbar and thoracic muscle soreness following STM. PPT performance again improved, tactile cueing provided to refine movement. Pt reported L side muscle tightness medial to the LEFT scapula during OH presses. Pt was instructed in and performed ball on wall rollouts for 3', stating it was beneficial, but caused a mild increase in soreness. Patient was advised to perform the rollouts as needed at home to reduce the thoracic muscle pain, pt verbalized understanding. Decreased lumbar pain  following STM, muscle soreness medial to L scapula.     Areas of improvements: Decreased low back pain following STM, improved seated posture w/ cueing  Limitations/deficits: Low back pain may increase with ADLs, reducing activity endurance.     Pain end of session: 3/10    Plan:    Continue with current POC/no changes    Assessment of current progress against goals:  Symptomatic relief with treatment and Insufficient treatment time to assess progress    Goals:  Active       PT Problem - Lx, thoracic       PT Goal 1 - STG       Start:  01/27/25    Expected End:  03/13/25       1.  Improve shoulder strength to 4+/5 at deficits  2.  Improve sitting posture with correct alignment of Cx region and shoulders  3.  Improve bilateral LE AROM 60 degrees flexion at deficits  4.  Improve LE strength to 4/5 or better at deficits  5.  Improve bilateral hamstring length to 80% of WFL  6.  Improve trunk flexibility to 75% of WFL  7.  Improve trunk strength [place] Fair+/Good-  8.  Pain:  0 to 1.   9.  Functional Outcome Measure:  WOMAC 12             PT Goal 2 - LT FUNCTIONAL GOALS       Start:  01/27/25    Expected End:  04/27/25       LONG TERM FUNCTIONAL GOALS  1.  Pt able to feel comfortable bending forward for IADLs  2. Pt able to vacuum and performing heavier IADLs 75% of the time with minimal to no pain  3. Pt able to sit for up to 10 minutes 75% of the time without needing to stand due to pain.  4. Pt able to stand for up to 10 minutes 80% of the time without needing to sit due to pain             Patient Stated Goal 1       Start:  01/27/25    Expected End:  04/27/25       Doesn't want recurrent of his symptoms  Want to perform heavier activities at home and not have the symptoms return

## 2025-02-07 ENCOUNTER — TREATMENT (OUTPATIENT)
Dept: PHYSICAL THERAPY | Facility: CLINIC | Age: 80
End: 2025-02-07
Payer: MEDICARE

## 2025-02-07 DIAGNOSIS — M54.6 THORACIC SPINE PAIN: ICD-10-CM

## 2025-02-07 DIAGNOSIS — G89.29 CHRONIC MIDLINE LOW BACK PAIN WITHOUT SCIATICA: ICD-10-CM

## 2025-02-07 DIAGNOSIS — M54.50 CHRONIC MIDLINE LOW BACK PAIN WITHOUT SCIATICA: ICD-10-CM

## 2025-02-07 PROCEDURE — 97140 MANUAL THERAPY 1/> REGIONS: CPT | Mod: CQ,GP | Performed by: SPECIALIST/TECHNOLOGIST

## 2025-02-07 PROCEDURE — 97110 THERAPEUTIC EXERCISES: CPT | Mod: CQ,GP | Performed by: SPECIALIST/TECHNOLOGIST

## 2025-02-10 NOTE — PROGRESS NOTES
"    Physical Therapy Treatment    Patient Name: Pablo Henson  MRN: [id number]  Encounter date:  2/12/2025  Time Calculation  Start Time: 1318  Stop Time: 1400  Time Calculation (min): 42 min     PT Therapeutic Procedures Time Entry  Manual Therapy Time Entry: 10  Therapeutic Exercise Time Entry: 30       Visit Number:  5 (including evaluation)  Planned total visits: 12  Visits Authorized/Insurance Coverage:  : MEDICARE A/B, [place], NO AUTH, ( $0 USED PT/ST)   PROGRESS REPORT NEEDED AT VISIT #10    Current Problem  Problem List Items Addressed This Visit    None  Visit Diagnoses         Codes    Chronic midline low back pain without sciatica     M54.50, G89.29    Thoracic spine pain     M54.6          Precautions  Relevant PMH:  DM - controlled  Bladder incontinence  H/o double hernia has his some groin pain on and off since     Red flags:  Bladder changes or incontinence     Pain  4/10    Subjective  General     Patient relays he has been performing his HEP and ADLs as able, noting continued back pain with lifting heavy objects, and reaching far out in front of him.    Objective     Improved PPT performance w/ cueing to reinforce instruction. Increased TTP in mid R thoracic medial to the R scapula.      Treatment:  Manual:  STM to lumbar region, pt seated leaning forward onto table, w/ deep prep     Therapeutic exercise:   NuStep, 5', level 1  Supine:       TrA bracing x 10, 5\" hold       Posterior pelvic tilt x 10, 5\" hold- Improved form, cueing needed        DKTC on stability ball x 20-  posterior BLE stretch         LTR on stability ball x 20 ea, 3\" hold        Seated:      Push outs w/ TrA bracing x 20, rainbow ball- pain in L pectoralis region      OH press x 20, rainbow ball      Seated posture corrections 10 x 10\"      Hamstring stretches 2 x 20\" ea      Hip Adduction x 20, 5\" holds, DNP      Hip abduction x 20, lime green TB DNP    Standing:      Forward and retro shoulder rolls 2 x 10 ea     " Standing rows 2 x 10, 7# resistance bands     Shoulder extension 2 x 10, 7# resistance bands     Stir the pot x 10 L/R, #7 resistance band      Ball on wall posterior muscle rollout x 3' DNP 2/12    Current HEP:  Access Code: [email or web address]  URL: [email or web address]  Date: 01/30/2025  Prepared by: Jeremy Jansen    Exercises  - Supine Transversus Abdominis Bracing - Hands on Stomach  - 1 x daily - 7 x weekly - 3 sets - 10 reps - 5 second hold hold  - Supine Posterior Pelvic Tilt  - 1 x daily - 7 x weekly - 3 sets - 10 reps - 5 second  hold  - Supine Single Knee to Chest Stretch  - 1 x daily - 7 x weekly - 3 sets - 20-30 second hold  - Supine Lower Trunk Rotation  - 1 x daily - 7 x weekly - 3 sets - 10 reps  - Seated Hip Abduction with Resistance  - 1 x daily - 7 x weekly - 3 sets - 10 reps  - Seated Hip Adduction Isometrics with Ball  - 1 x daily - 7 x weekly - 3 sets - 10 reps  - Seated Long Arc Quad  - 1 x daily - 7 x weekly - 3 sets - 10 reps  - Standing Alternating Knee Flexion  - 1 x daily - 7 x weekly - 3 sets - 10 reps  - Correct Seated Posture  - 1 x daily - 7 x weekly - 3 sets - 10 reps  - Seated Hamstring Stretch  - 1 x daily - 7 x weekly - 3 reps - 20-30 seconds hold  - Seated Overhead Press with Dumbbells  - 1 x daily - 7 x weekly - 3 sets - 10 reps  - Seated Chest Press  - 1 x daily - 7 x weekly - 3 sets - 10 reps    Has patient been compliant with HEP? Yes    Activity tolerance:  Good    OP EDUCATION:    Assessment:    Pt's response to treatment: Thoracic muscle pain again decreased following STM, increased muscle tightness medial to the RIGHT scapula noted, reduced with treatment. Pot stirs introduced with verbal and visual instruction to improve ability to utilize abdominal muscles to stabilize standing dynamic balance, good performance and response. Standing rows and shoulder extensions added to improve shoulder muscle strength. UE fatigue following new therex, denied increased back pain.     Areas  of improvements: Decreased thoracic back pain following STM, improved seated posture w/ cueing  Limitations/deficits: Low back pain may increase with ADLs, reducing activity endurance.     Pain end of session: 3/10    Plan:    Continue with current POC/no changes    Assessment of current progress against goals:  Symptomatic relief with treatment and Insufficient treatment time to assess progress    Goals:  Active       PT Problem - Lx, thoracic       PT Goal 1 - STG       Start:  01/27/25    Expected End:  03/13/25       1.  Improve shoulder strength to 4+/5 at deficits  2.  Improve sitting posture with correct alignment of Cx region and shoulders  3.  Improve bilateral LE AROM 60 degrees flexion at deficits  4.  Improve LE strength to 4/5 or better at deficits  5.  Improve bilateral hamstring length to 80% of WFL  6.  Improve trunk flexibility to 75% of WFL  7.  Improve trunk strength [place] Fair+/Good-  8.  Pain:  0 to 1.   9.  Functional Outcome Measure:  WOMAC 12             PT Goal 2 - LT FUNCTIONAL GOALS       Start:  01/27/25    Expected End:  04/27/25       LONG TERM FUNCTIONAL GOALS  1.  Pt able to feel comfortable bending forward for IADLs  2. Pt able to vacuum and performing heavier IADLs 75% of the time with minimal to no pain  3. Pt able to sit for up to 10 minutes 75% of the time without needing to stand due to pain.  4. Pt able to stand for up to 10 minutes 80% of the time without needing to sit due to pain             Patient Stated Goal 1       Start:  01/27/25    Expected End:  04/27/25       Doesn't want recurrent of his symptoms  Want to perform heavier activities at home and not have the symptoms return

## 2025-02-12 ENCOUNTER — TREATMENT (OUTPATIENT)
Dept: PHYSICAL THERAPY | Facility: CLINIC | Age: 80
End: 2025-02-12
Payer: MEDICARE

## 2025-02-12 DIAGNOSIS — M54.50 CHRONIC MIDLINE LOW BACK PAIN WITHOUT SCIATICA: ICD-10-CM

## 2025-02-12 DIAGNOSIS — G89.29 CHRONIC MIDLINE LOW BACK PAIN WITHOUT SCIATICA: ICD-10-CM

## 2025-02-12 DIAGNOSIS — M54.6 THORACIC SPINE PAIN: ICD-10-CM

## 2025-02-12 PROCEDURE — 97110 THERAPEUTIC EXERCISES: CPT | Mod: CQ,GP | Performed by: SPECIALIST/TECHNOLOGIST

## 2025-02-12 PROCEDURE — 97140 MANUAL THERAPY 1/> REGIONS: CPT | Mod: CQ,GP | Performed by: SPECIALIST/TECHNOLOGIST

## 2025-02-14 ENCOUNTER — TREATMENT (OUTPATIENT)
Dept: PHYSICAL THERAPY | Facility: CLINIC | Age: 80
End: 2025-02-14
Payer: MEDICARE

## 2025-02-14 DIAGNOSIS — M54.6 THORACIC SPINE PAIN: ICD-10-CM

## 2025-02-14 DIAGNOSIS — G89.29 CHRONIC MIDLINE LOW BACK PAIN WITHOUT SCIATICA: ICD-10-CM

## 2025-02-14 DIAGNOSIS — M54.50 CHRONIC MIDLINE LOW BACK PAIN WITHOUT SCIATICA: ICD-10-CM

## 2025-02-14 PROCEDURE — 97140 MANUAL THERAPY 1/> REGIONS: CPT | Mod: CQ,GP | Performed by: SPECIALIST/TECHNOLOGIST

## 2025-02-14 PROCEDURE — 97110 THERAPEUTIC EXERCISES: CPT | Mod: CQ,GP | Performed by: SPECIALIST/TECHNOLOGIST

## 2025-02-14 NOTE — PROGRESS NOTES
"    Physical Therapy Treatment    Patient Name: Pablo Henson  MRN: [id number]  Encounter date:  2/14/2025  Time Calculation  Start Time: 1400  Stop Time: 1445  Time Calculation (min): 45 min     PT Therapeutic Procedures Time Entry  Manual Therapy Time Entry: 10  Therapeutic Exercise Time Entry: 30       Visit Number:  6 (including evaluation)  Planned total visits: 12  Visits Authorized/Insurance Coverage:  : MEDICARE A/B, [place], [place], NO AUTH, ( $0 USED PT/ST)   PROGRESS REPORT NEEDED AT VISIT #10    Current Problem  Problem List Items Addressed This Visit    None  Visit Diagnoses         Codes    Chronic midline low back pain without sciatica     M54.50, G89.29    Thoracic spine pain     M54.6          Precautions  Relevant PMH:  DM - controlled  Bladder incontinence  H/o double hernia has his some groin pain on and off since     Red flags:  Bladder changes or incontinence     Pain  4/10    Subjective  General     Patient relays he has been performing his HEP and ADLs as able.     Objective    Improved UE strength. Asymmetrical UE flexion, L>R, on OH press. Able to increase weight of ball used.       Treatment:  Manual:  STM to lumbar region, pt seated leaning forward onto table, w/ deep prep     Therapeutic exercise:   NuStep, 5', level 1  Supine:       TrA bracing x 10, 5\" hold        Posterior pelvic tilt x 10, 5\" hold- Improved form, cueing needed        DKTC on stability ball x 20-  posterior BLE stretch         LTR on stability ball x 20 ea, 3\" hold        Seated:      Push outs w/ TrA bracing x 20, rainbow ball- 1 kg ball      OH press x 20, 1 kg ball,       Seated posture corrections 10 x 10\"      Hamstring stretches 2 x 20\" ea     Standing:      Forward and retro shoulder rolls 2 x 10 ea     Standing rows 2 x 10, 7# resistance bands     Shoulder extension 2 x 10, 7# resistance bands     Stir the pot x 10 L/R, #7 resistance band       DNP 2/14    Hip Adduction x 20, 5\" holds, DNP    Hip abduction x 20, " lime green TB DNP    Ball on wall posterior muscle massage x 3'     Current HEP:  Access Code: [email or web address]  URL: [email or web address]  Date: 01/30/2025  Prepared by: Jeremy Jansen    Exercises  - Supine Transversus Abdominis Bracing - Hands on Stomach  - 1 x daily - 7 x weekly - 3 sets - 10 reps - 5 second hold hold  - Supine Posterior Pelvic Tilt  - 1 x daily - 7 x weekly - 3 sets - 10 reps - 5 second  hold  - Supine Single Knee to Chest Stretch  - 1 x daily - 7 x weekly - 3 sets - 20-30 second hold  - Supine Lower Trunk Rotation  - 1 x daily - 7 x weekly - 3 sets - 10 reps  - Seated Hip Abduction with Resistance  - 1 x daily - 7 x weekly - 3 sets - 10 reps  - Seated Hip Adduction Isometrics with Ball  - 1 x daily - 7 x weekly - 3 sets - 10 reps  - Seated Long Arc Quad  - 1 x daily - 7 x weekly - 3 sets - 10 reps  - Standing Alternating Knee Flexion  - 1 x daily - 7 x weekly - 3 sets - 10 reps  - Correct Seated Posture  - 1 x daily - 7 x weekly - 3 sets - 10 reps  - Seated Hamstring Stretch  - 1 x daily - 7 x weekly - 3 reps - 20-30 seconds hold  - Seated Overhead Press with Dumbbells  - 1 x daily - 7 x weekly - 3 sets - 10 reps  - Seated Chest Press  - 1 x daily - 7 x weekly - 3 sets - 10 reps    Has patient been compliant with HEP? Yes    Activity tolerance:  Good    OP EDUCATION:    Assessment:    Pt's response to treatment: Thoracic muscle pain again decreased following STM.  Decreased RUE flexion vs L noted during OH presses. Good response to increased weight of ball used on chest and OH presses. Verbal and visual cueing provided to improve posture and technique of pot stirs, good carryover. Resistance for standing rows, shoulder extensions, and pot stirs remained the same. UE fatigue following new therex, denied increased back pain.     Areas of improvements: Improved seated posture w/ decreased cueing, improved UE strength.   Limitations/deficits: Low back pain may increase with ADLs, reducing activity  endurance.     Pain end of session: 3/10    Plan:    Continue with current POC/no changes    Assessment of current progress against goals:  Symptomatic relief with treatment and Insufficient treatment time to assess progress    Goals:  Active       PT Problem - Lx, thoracic       PT Goal 1 - STG       Start:  01/27/25    Expected End:  03/13/25       1.  Improve shoulder strength to 4+/5 at deficits  2.  Improve sitting posture with correct alignment of Cx region and shoulders  3.  Improve bilateral LE AROM 60 degrees flexion at deficits  4.  Improve LE strength to 4/5 or better at deficits  5.  Improve bilateral hamstring length to 80% of WFL  6.  Improve trunk flexibility to 75% of WFL  7.  Improve trunk strength [place] Fair+/Good-  8.  Pain:  0 to 1.   9.  Functional Outcome Measure:  WOMAC 12             PT Goal 2 - LT FUNCTIONAL GOALS       Start:  01/27/25    Expected End:  04/27/25       LONG TERM FUNCTIONAL GOALS  1.  Pt able to feel comfortable bending forward for IADLs  2. Pt able to vacuum and performing heavier IADLs 75% of the time with minimal to no pain  3. Pt able to sit for up to 10 minutes 75% of the time without needing to stand due to pain.  4. Pt able to stand for up to 10 minutes 80% of the time without needing to sit due to pain             Patient Stated Goal 1       Start:  01/27/25    Expected End:  04/27/25       Doesn't want recurrent of his symptoms  Want to perform heavier activities at home and not have the symptoms return

## 2025-02-17 NOTE — PROGRESS NOTES
"    Physical Therapy Treatment    Patient Name: Pablo Henson  MRN: [id number]  Encounter date:  2/19/2025  Time Calculation  Start Time: 1416  Stop Time: 1459  Time Calculation (min): 43 min     PT Therapeutic Procedures Time Entry  Therapeutic Exercise Time Entry: 40    Visit Number:  7 (including evaluation)  Visits Authorized/Insurance Coverage:  : MEDICARE A/B, [place], [place], NO AUTH, ( $0 USED PT/ST)   PROGRESS REPORT NEEDED AT VISIT #10    Current Problem  Problem List Items Addressed This Visit    None  Visit Diagnoses         Codes    Chronic midline low back pain without sciatica     M54.50, G89.29    Thoracic spine pain     M54.6           Precautions  Precautions  Precautions Comment: h/o double hernia      Pain  Pain Assessment: 0-10  0-10 (Numeric) Pain Score: 4  Response to Interventions: Decrease in pain (3)    Subjective  General  General Comment: Went to get up off floor on Saturday and fellt something in his right Lx area and is still sore.    PT  Visit  Response to Previous Treatment: Patient with no complaints from previous session.    Objective  Kyphosis, significantly week left scapula, with scapular winging    Treatment:      NuStep, 5', level 2    Standing  RSB roll up 10x, 3x hold  60 deg doorway pec stretch 3x15s hold  Posterior capsule stretch 2x15s  Bilateral thoracic and cx 15s but pt prefers posterior cap stretch    GTB, scapular retraction, palm up - multiple times needed to get form, only needs to perform 12x, attempted with GTB but was too much resistance    Bilateral D2 flexion, 2# 12x    DNP    Supine:       TrA bracing x 10, 5\" hold        Posterior pelvic tilt x 10, 5\" hold- Improved form, cueing needed        DKTC on stability ball x 20-  posterior BLE stretch         LTR on stability ball x 20 ea, 3\" hold        Seated:      Push outs w/ TrA bracing x 20, rainbow ball- 1 kg ball      OH press x 20, 1 kg ball,       Seated posture corrections 10 x 10\"      Hamstring stretches 2 " "x 20\" ea     Standing:      Forward and retro shoulder rolls 2 x 10 ea     Standing rows 2 x 10, 7# resistance bands     Shoulder extension 2 x 10, 7# resistance bands     Stir the pot x 10 L/R, #7 resistance band         Current HEP:  Access Code: [email or web address]  URL: [email or web address]  Date: 01/30/2025  Prepared by: Jeremy Jansen     Exercises  - Supine Transversus Abdominis Bracing - Hands on Stomach  - 1 x daily - 7 x weekly - 3 sets - 10 reps - 5 second hold hold  - Supine Posterior Pelvic Tilt  - 1 x daily - 7 x weekly - 3 sets - 10 reps - 5 second  hold  - Supine Single Knee to Chest Stretch  - 1 x daily - 7 x weekly - 3 sets - 20-30 second hold  - Supine Lower Trunk Rotation  - 1 x daily - 7 x weekly - 3 sets - 10 reps  - Seated Hip Abduction with Resistance  - 1 x daily - 7 x weekly - 3 sets - 10 reps  - Seated Hip Adduction Isometrics with Ball  - 1 x daily - 7 x weekly - 3 sets - 10 reps  - Seated Long Arc Quad  - 1 x daily - 7 x weekly - 3 sets - 10 reps  - Standing Alternating Knee Flexion  - 1 x daily - 7 x weekly - 3 sets - 10 reps  - Correct Seated Posture  - 1 x daily - 7 x weekly - 3 sets - 10 reps  - Seated Hamstring Stretch  - 1 x daily - 7 x weekly - 3 reps - 20-30 seconds hold  - Seated Overhead Press with Dumbbells  - 1 x daily - 7 x weekly - 3 sets - 10 reps  - Seated Chest Press  - 1 x daily - 7 x weekly - 3 sets - 10 reps       Has patient been compliant with HEP?  Yes    Billed Treatment Times:  Therapeutic Exercise 40 min    OP EDUCATION:  Outpatient Education  Individual(s) Educated: Patient  Education Comment: today exercises    Assessment:  PT Assessment  Assessment Comment: Pt has some difficuilty performing the exercises correctly but  with pt educat and cues, he was able to perform correctly.  These will be added to his HEP  Areas of improvements:  Decreased pain, Improved stability, and Improved strength  Limitations/deficits:   pain and tightness    Plan:     Continue with " current POC/no changes    Assessment of current progress against goals:  Progressing toward functional goals    Goals:  Active       PT Problem - Lx, thoracic       PT Goal 1 - STG       Start:  01/27/25    Expected End:  03/13/25       1.  Improve shoulder strength to 4+/5 at deficits  2.  Improve sitting posture with correct alignment of Cx region and shoulders  3.  Improve bilateral LE AROM 60 degrees flexion at deficits  4.  Improve LE strength to 4/5 or better at deficits  5.  Improve bilateral hamstring length to 80% of WFL  6.  Improve trunk flexibility to 75% of WFL  7.  Improve trunk strength [place] Fair+/Good-  8.  Pain:  0 to 1.   9.  Functional Outcome Measure:  WOMAC 12             PT Goal 2 - LT FUNCTIONAL GOALS       Start:  01/27/25    Expected End:  04/27/25       LONG TERM FUNCTIONAL GOALS  1.  Pt able to feel comfortable bending forward for IADLs  2. Pt able to vacuum and performing heavier IADLs 75% of the time with minimal to no pain  3. Pt able to sit for up to 10 minutes 75% of the time without needing to stand due to pain.  4. Pt able to stand for up to 10 minutes 80% of the time without needing to sit due to pain             Patient Stated Goal 1       Start:  01/27/25    Expected End:  04/27/25       Doesn't want recurrent of his symptoms  Want to perform heavier activities at home and not have the symptoms return

## 2025-02-19 ENCOUNTER — TREATMENT (OUTPATIENT)
Dept: PHYSICAL THERAPY | Facility: CLINIC | Age: 80
End: 2025-02-19
Payer: MEDICARE

## 2025-02-19 DIAGNOSIS — M54.6 THORACIC SPINE PAIN: ICD-10-CM

## 2025-02-19 DIAGNOSIS — G89.29 CHRONIC MIDLINE LOW BACK PAIN WITHOUT SCIATICA: ICD-10-CM

## 2025-02-19 DIAGNOSIS — M54.50 CHRONIC MIDLINE LOW BACK PAIN WITHOUT SCIATICA: ICD-10-CM

## 2025-02-19 PROCEDURE — 97110 THERAPEUTIC EXERCISES: CPT | Mod: GP | Performed by: PHYSICAL THERAPIST

## 2025-02-19 ASSESSMENT — PAIN - FUNCTIONAL ASSESSMENT: PAIN_FUNCTIONAL_ASSESSMENT: 0-10

## 2025-02-19 ASSESSMENT — PAIN SCALES - GENERAL: PAINLEVEL_OUTOF10: 4

## 2025-02-20 NOTE — PROGRESS NOTES
"    Physical Therapy Treatment    Patient Name: Pablo Henson  MRN: [id number]  Encounter date:  2/21/2025  Time Calculation  Start Time: 1315  Stop Time: 1400  Time Calculation (min): 45 min     PT Therapeutic Procedures Time Entry  Therapeutic Exercise Time Entry: 40    Visit Number:  8 (including evaluation)  Visits Authorized/Insurance Coverage:  : MEDICARE A/B, [place], [place], NO AUTH, ( $0 USED PT/ST)   PROGRESS REPORT NEEDED AT VISIT #10    Current Problem  Problem List Items Addressed This Visit    None  Visit Diagnoses         Codes    Chronic midline low back pain without sciatica     M54.50, G89.29    Thoracic spine pain     M54.6           Precautions  Precautions  Precautions Comment: h/o double hernia      Pain  Pain Assessment: 0-10  0-10 (Numeric) Pain Score: 1  Response to Interventions: No change in pain    Subjective  General  General Comment: Pt feels the activities from last visit have helped    PT  Visit  Response to Previous Treatment: Patient with no complaints from previous session.    Objective  Kyphosis  H/o of right shoulder pain    Treatment:  Therapeutic Exercise  Therapeutic Exercise Performed: Yes   NuStep, 6', level 2. Hill profile 4, left quad soreness, f/b standing quad stretch 4x 7s     Standing  YSB roll up  (RSB not available) 15x, then 5x with 10s hold (lean into ball for full stetch)    //bar, lateral thoracic stretch R/L 10s hold x 3    60 deg doorway pec stretch 3x15s hold  Posterior capsule stretch 2x15s     GTB, scapular retraction, palm up - multiple times needed to get form, only needs to perform 12x, attempted with GTB but was too much resistance     Bilateral D2 flexion, 2# 2x10    2#, Ws, 2x10    Horizontal abduction ball pass, 7 oz red ball, then overhead (partial range overhead due to right shoulder issue) 2x10, alternating     Wall push up 10s  Bilateral thoracic stretch    DNP     Supine:       TrA bracing x 10, 5\" hold        Posterior pelvic tilt x 10, 5\" hold- " "Improved form, cueing needed        DKTC on stability ball x 20-  posterior BLE stretch         LTR on stability ball x 20 ea, 3\" hold        Seated:      Push outs w/ TrA bracing x 20, rainbow ball- 1 kg ball      OH press x 20, 1 kg ball,       Seated posture corrections 10 x 10\"      Hamstring stretches 2 x 20\" ea     Standing:      Forward and retro shoulder rolls 2 x 10 ea     Standing rows 2 x 10, 7# resistance bands     Shoulder extension 2 x 10, 7# resistance bands     Stir the pot x 10 L/R, #7 resistance band          Current HEP:  Access Code: [email or web address]  URL: [email or web address]  Date: 01/30/2025  Prepared by: Jeremy Jansen     Exercises  - Supine Transversus Abdominis Bracing - Hands on Stomach  - 1 x daily - 7 x weekly - 3 sets - 10 reps - 5 second hold hold  - Supine Posterior Pelvic Tilt  - 1 x daily - 7 x weekly - 3 sets - 10 reps - 5 second  hold  - Supine Single Knee to Chest Stretch  - 1 x daily - 7 x weekly - 3 sets - 20-30 second hold  - Supine Lower Trunk Rotation  - 1 x daily - 7 x weekly - 3 sets - 10 reps  - Seated Hip Abduction with Resistance  - 1 x daily - 7 x weekly - 3 sets - 10 reps  - Seated Hip Adduction Isometrics with Ball  - 1 x daily - 7 x weekly - 3 sets - 10 reps  - Seated Long Arc Quad  - 1 x daily - 7 x weekly - 3 sets - 10 reps  - Standing Alternating Knee Flexion  - 1 x daily - 7 x weekly - 3 sets - 10 reps  - Correct Seated Posture  - 1 x daily - 7 x weekly - 3 sets - 10 reps  - Seated Hamstring Stretch  - 1 x daily - 7 x weekly - 3 reps - 20-30 seconds hold  - Seated Overhead Press with Dumbbells  - 1 x daily - 7 x weekly - 3 sets - 10 reps  - Seated Chest Press  - 1 x daily - 7 x weekly - 3 sets - 10 reps    Access Code: [email or web address]  URL: [email or web address]  Date: 02/21/2025  Prepared by: Tamela Robles    Exercises  - Standing 'L' Stretch at Counter  - 1 x daily - 3-5 x weekly - 1 sets - 3 reps - 10 sec hold    W's    Has patient been compliant with HEP? "  Yes    Billed Treatment Times:  Therapeutic Exercise 40 min    OP EDUCATION:  Outpatient Education  Individual(s) Educated: Patient  Education Comment: hand outs for new exerices    Assessment:  PT Assessment  Assessment Comment: Pt continues to be challenged by the additional exercises.  He has been very compliant performing the exercises and his efforts has been positive as he is able to perform last week's exercises correctly.  Areas of improvements:  Decreased pain, Improved stability, and Improved strength  Limitations/deficits:  Weakness and pain, posture    Plan:     Continue with current POC/no changes    Assessment of current progress against goals:  Progressing toward functional goals    Goals:  Active       PT Problem - Lx, thoracic       PT Goal 1 - STG       Start:  01/27/25    Expected End:  03/13/25       1.  Improve shoulder strength to 4+/5 at deficits  2.  Improve sitting posture with correct alignment of Cx region and shoulders  3.  Improve bilateral LE AROM 60 degrees flexion at deficits  4.  Improve LE strength to 4/5 or better at deficits  5.  Improve bilateral hamstring length to 80% of WFL  6.  Improve trunk flexibility to 75% of WFL  7.  Improve trunk strength [place] Fair+/Good-  8.  Pain:  0 to 1.   9.  Functional Outcome Measure:  WOMAC 12             PT Goal 2 - LT FUNCTIONAL GOALS       Start:  01/27/25    Expected End:  04/27/25       LONG TERM FUNCTIONAL GOALS  1.  Pt able to feel comfortable bending forward for IADLs  2. Pt able to vacuum and performing heavier IADLs 75% of the time with minimal to no pain  3. Pt able to sit for up to 10 minutes 75% of the time without needing to stand due to pain.  4. Pt able to stand for up to 10 minutes 80% of the time without needing to sit due to pain             Patient Stated Goal 1       Start:  01/27/25    Expected End:  04/27/25       Doesn't want recurrent of his symptoms  Want to perform heavier activities at home and not have the symptoms  return

## 2025-02-21 ENCOUNTER — TREATMENT (OUTPATIENT)
Dept: PHYSICAL THERAPY | Facility: CLINIC | Age: 80
End: 2025-02-21
Payer: MEDICARE

## 2025-02-21 DIAGNOSIS — G89.29 CHRONIC MIDLINE LOW BACK PAIN WITHOUT SCIATICA: ICD-10-CM

## 2025-02-21 DIAGNOSIS — M54.50 CHRONIC MIDLINE LOW BACK PAIN WITHOUT SCIATICA: ICD-10-CM

## 2025-02-21 DIAGNOSIS — M54.6 THORACIC SPINE PAIN: ICD-10-CM

## 2025-02-21 PROCEDURE — 97110 THERAPEUTIC EXERCISES: CPT | Mod: GP | Performed by: PHYSICAL THERAPIST

## 2025-02-21 ASSESSMENT — PAIN - FUNCTIONAL ASSESSMENT: PAIN_FUNCTIONAL_ASSESSMENT: 0-10

## 2025-02-21 ASSESSMENT — PAIN SCALES - GENERAL: PAINLEVEL_OUTOF10: 1

## 2025-02-24 ENCOUNTER — APPOINTMENT (OUTPATIENT)
Dept: PHYSICAL THERAPY | Facility: CLINIC | Age: 80
End: 2025-02-24
Payer: MEDICARE

## 2025-02-24 NOTE — PROGRESS NOTES
Physical Therapy Treatment    Patient Name: Pablo Henson  MRN: [id number]  Encounter date:  2/26/2025  Time Calculation  Start Time: 1330  Stop Time: 1413  Time Calculation (min): 43 min     PT Therapeutic Procedures Time Entry  Therapeutic Exercise Time Entry: 40    Visit Number:  9 (including evaluation)  Visits Authorized/Insurance Coverage:  : MEDICARE A/B, [place], [place], NO AUTH, ( $0 USED PT/ST)   PROGRESS REPORT NEEDED AT VISIT #10    Current Problem  Problem List Items Addressed This Visit    None  Visit Diagnoses         Codes    Chronic midline low back pain without sciatica     M54.50, G89.29    Thoracic spine pain     M54.6           Precautions  Precautions  Precautions Comment: h/o double hernia      Pain  Pain Assessment: 0-10  0-10 (Numeric) Pain Score: 2 (upper low back)  Response to Interventions: No change in pain    Subjective  General  General Comment: Pt thinks he injuries his HS but he stretched and drank water and it is feeling better    PT  Visit  Response to Previous Treatment: Patient with no complaints from previous session.    Objective  Flexed posturing    Treatment:  Therapeutic Exercise  Therapeutic Exercise Performed: Yes   NuStep, 6', level 2. Hill profile 4,    Moflex [id number]    Wall calve stretch 3x30s  Seated HS stretch 3x30s    Handrail L stretch [id number], laterals 2x15s hold    Ladder, yellow symmetry (10#) 2x12  Pulldown  Shoulder Extension  Stir the pot    RSB roll up  15x, then 5x with 10s hold (lean into ball for full stetch)     60 deg doorway pec stretch 3x15s hold  Posterior capsule stretch 2x15s    L back stretch, middle, right left 2x15s each    Counter push ups 10x    Ladder  Yellow crossover bands 12x, twice  Shoulder extension    DNP     GTB, scapular retraction, palm up - multiple times needed to get form, only needs to perform 12x, attempted with GTB but was too much resistance     Bilateral D2 flexion, 2# 2x10     2#, Ws, 2x10     Horizontal abduction ball  "pass, 7 oz red ball, then overhead (partial range overhead due to right shoulder issue) 2x10, alternating     Bilateral thoracic stretch       Seated:      Push outs w/ TrA bracing x 20, rainbow ball- 1 kg ball      OH press x 20, 1 kg ball,       Seated posture corrections 10 x 10\"             Current HEP:  Access Code: [email or web address]  URL: [email or web address]  Date: 01/30/2025  Prepared by: Jeremy Jansen     Exercises  - Supine Transversus Abdominis Bracing - Hands on Stomach  - 1 x daily - 7 x weekly - 3 sets - 10 reps - 5 second hold hold  - Supine Posterior Pelvic Tilt  - 1 x daily - 7 x weekly - 3 sets - 10 reps - 5 second  hold  - Supine Single Knee to Chest Stretch  - 1 x daily - 7 x weekly - 3 sets - 20-30 second hold  - Supine Lower Trunk Rotation  - 1 x daily - 7 x weekly - 3 sets - 10 reps  - Seated Hip Abduction with Resistance  - 1 x daily - 7 x weekly - 3 sets - 10 reps  - Seated Hip Adduction Isometrics with Ball  - 1 x daily - 7 x weekly - 3 sets - 10 reps  - Seated Long Arc Quad  - 1 x daily - 7 x weekly - 3 sets - 10 reps  - Standing Alternating Knee Flexion  - 1 x daily - 7 x weekly - 3 sets - 10 reps  - Correct Seated Posture  - 1 x daily - 7 x weekly - 3 sets - 10 reps  - Seated Hamstring Stretch  - 1 x daily - 7 x weekly - 3 reps - 20-30 seconds hold  - Seated Overhead Press with Dumbbells  - 1 x daily - 7 x weekly - 3 sets - 10 reps  - Seated Chest Press  - 1 x daily - 7 x weekly - 3 sets - 10 reps     Access Code: [email or web address]  URL: [email or web address]  Date: 02/21/2025  Prepared by: Tamela Robles     Exercises  - Standing 'L' Stretch at Counter  - 1 x daily - 3-5 x weekly - 1 sets - 3 reps - 10 sec hold     W's    Has patient been compliant with HEP?  Yes    Billed Treatment Times:  Therapeutic Exercise 40 min    OP EDUCATION:  Outpatient Education  Individual(s) Educated: Patient  Education Comment: added calf stretch    Assessment:  PT Assessment  Assessment Comment: Pt able to " perform stretches for mid back and low back but pain persisted  Areas of improvements:  Decreased pain, Improved stability, and Improved strength  Limitations/deficits:  Weakness and today upper low backing and stiffness    Plan:     Continue with current POC/no changes    Assessment of current progress against goals:  Progressing toward functional goals    Goals:  Active       PT Problem - Lx, thoracic       PT Goal 1 - STG       Start:  01/27/25    Expected End:  03/13/25       1.  Improve shoulder strength to 4+/5 at deficits  2.  Improve sitting posture with correct alignment of Cx region and shoulders  3.  Improve bilateral LE AROM 60 degrees flexion at deficits  4.  Improve LE strength to 4/5 or better at deficits  5.  Improve bilateral hamstring length to 80% of WFL  6.  Improve trunk flexibility to 75% of WFL  7.  Improve trunk strength [place] Fair+/Good-  8.  Pain:  0 to 1.   9.  Functional Outcome Measure:  WOMAC 12             PT Goal 2 - LT FUNCTIONAL GOALS       Start:  01/27/25    Expected End:  04/27/25       LONG TERM FUNCTIONAL GOALS  1.  Pt able to feel comfortable bending forward for IADLs  2. Pt able to vacuum and performing heavier IADLs 75% of the time with minimal to no pain  3. Pt able to sit for up to 10 minutes 75% of the time without needing to stand due to pain.  4. Pt able to stand for up to 10 minutes 80% of the time without needing to sit due to pain             Patient Stated Goal 1       Start:  01/27/25    Expected End:  04/27/25       Doesn't want recurrent of his symptoms  Want to perform heavier activities at home and not have the symptoms return

## 2025-02-26 ENCOUNTER — TREATMENT (OUTPATIENT)
Dept: PHYSICAL THERAPY | Facility: CLINIC | Age: 80
End: 2025-02-26
Payer: MEDICARE

## 2025-02-26 DIAGNOSIS — G89.29 CHRONIC MIDLINE LOW BACK PAIN WITHOUT SCIATICA: ICD-10-CM

## 2025-02-26 DIAGNOSIS — M54.6 THORACIC SPINE PAIN: ICD-10-CM

## 2025-02-26 DIAGNOSIS — M54.50 CHRONIC MIDLINE LOW BACK PAIN WITHOUT SCIATICA: ICD-10-CM

## 2025-02-26 PROCEDURE — 97110 THERAPEUTIC EXERCISES: CPT | Mod: GP | Performed by: PHYSICAL THERAPIST

## 2025-02-26 ASSESSMENT — PAIN - FUNCTIONAL ASSESSMENT: PAIN_FUNCTIONAL_ASSESSMENT: 0-10

## 2025-02-26 ASSESSMENT — PAIN SCALES - GENERAL: PAINLEVEL_OUTOF10: 2

## 2025-02-26 NOTE — PROGRESS NOTES
Physical Therapy Progress Report/ Treatment    Patient Name: Pablo Henson  MRN: [id number]  Encounter date:  3/3/2025  Time Calculation  Start Time: 1330  Stop Time: 1414  Time Calculation (min): 44 min     PT Therapeutic Procedures Time Entry  Therapeutic Exercise Time Entry: 38    Visit Number:  10 (including evaluation)  Visits Authorized/Insurance Coverage:  : MEDICARE A/B, [place], [place], NO AUTH, ( $0 USED PT/ST)   PROGRESS REPORT NEEDED AT VISIT #10    Current Problem  Problem List Items Addressed This Visit    None  Visit Diagnoses         Codes    Chronic midline low back pain without sciatica     M54.50, G89.29    Thoracic spine pain     M54.6           Precautions  Precautions  Precautions Comment: h/o double hernia      Pain  Pain Assessment: 0-10  0-10 (Numeric) Pain Score: 2 (past week 1-2)  Response to Interventions: No change in pain    Subjective  General  General Comment: Saturday pt pulled his left shoulder using the weights  but feels okay now.         Objective  ROM and Strength:     Cervical:     AROM:  WFL     Shoulder:     AROM:  WFL            STRENGTH   Shoulder R L   Flexion 5/5 5/5   Extension 5/5 5/5   Abduction 5/5 5/5   Internal  Rotation 5/5 5/5   External Rotation 4+/5 4+/5      Elbow:     Strength:  WFL and AROM:  WFL     See below            Lumbar AROM STRENGTH     Flexion 25% limited* good    Extension 25% good    /////////////////////////////////////////////////////////////     AROM STRENGTH     R L R L   Rotation 50% limited 50%   limited good good   Sidebend WFL WFL fair+ good      *pt with some lower back pain today and sitting is painful >15-20 minutes     Hip:     See below                 AROM STRENGTH   Hip R L R L   Hip Flexion 70 70 5/5 4/5 *double hernia   Hip Abduction WFL WFL 5/5 5/5   Hip Adduction WFL WFL 5/5 5/5         Knee:     AROM:  WFL            STRENGTH   Knee R L   Knee Flexion 5/5 5/5   Knee Extension 5/5 5/5         Gait:  Gait Comment: flexed  "posturing, kyphosos, good martha and SAYRA     Transfers:  Transfers Comment: independent     Outcome Measures:  Other Measures  Oswestry Disablity Index (HERB): 24 ; 03/03/2025 13    Treatment:  Therapeutic Exercise  Therapeutic Exercise Performed: Yes    Reviewed pt's HEP and performed the following:    Ladder, mint TB 12x  Pulldown  Adduction  tricep  Shoulder Extension  Stir the pot    Wall push out with resistance (mint TB)      DNP   NuStep, 6', level 2. Hill profile 4,     Moflex [id number]     Wall calve stretch 3x30s  Seated HS stretch 3x30s     Handrail L stretch [id number], laterals 2x15s hold    RSB roll up  15x, then 5x with 10s hold (lean into ball for full stetch)     60 deg doorway pec stretch 3x15s hold  Posterior capsule stretch 2x15s     L back stretch, middle, right left 2x15s each     Counter push ups 10x     Ladder  Yellow crossover bands 12x, twice  Shoulder extension     DNP     GTB, scapular retraction, palm up - multiple times needed to get form, only needs to perform 12x, attempted with GTB but was too much resistance     Bilateral D2 flexion, 2# 2x10     2#, Ws, 2x10     Horizontal abduction ball pass, 7 oz red ball, then overhead (partial range overhead due to right shoulder issue) 2x10, alternating     Bilateral thoracic stretch       Seated:      Push outs w/ TrA bracing x 20, rainbow ball- 1 kg ball      OH press x 20, 1 kg ball,       Seated posture corrections 10 x 10\"          Current HEP:  Access Code: [email or web address]  URL: [email or web address]  Date: 01/30/2025  Prepared by: Jeremy Jansen     Exercises  - Supine Transversus Abdominis Bracing - Hands on Stomach  - 1 x daily - 7 x weekly - 3 sets - 10 reps - 5 second hold hold  - Supine Posterior Pelvic Tilt  - 1 x daily - 7 x weekly - 3 sets - 10 reps - 5 second  hold  - Supine Single Knee to Chest Stretch  - 1 x daily - 7 x weekly - 3 sets - 20-30 second hold  - Supine Lower Trunk Rotation  - 1 x daily - 7 x weekly - 3 sets - 10 reps  - " Seated Hip Abduction with Resistance  - 1 x daily - 7 x weekly - 3 sets - 10 reps  - Seated Hip Adduction Isometrics with Ball  - 1 x daily - 7 x weekly - 3 sets - 10 reps  - Seated Long Arc Quad  - 1 x daily - 7 x weekly - 3 sets - 10 reps  - Standing Alternating Knee Flexion  - 1 x daily - 7 x weekly - 3 sets - 10 reps  - Correct Seated Posture  - 1 x daily - 7 x weekly - 3 sets - 10 reps  - Seated Hamstring Stretch  - 1 x daily - 7 x weekly - 3 reps - 20-30 seconds hold  - Seated Overhead Press with Dumbbells  - 1 x daily - 7 x weekly - 3 sets - 10 reps  - Seated Chest Press  - 1 x daily - 7 x weekly - 3 sets - 10 reps     Access Code: [email or web address]  URL: [email or web address]  Date: 02/21/2025  Prepared by: Tamela Robles     Exercises  - Standing 'L' Stretch at Counter  - 1 x daily - 3-5 x weekly - 1 sets - 3 reps - 10 sec hold     W's    Access Code: [email or web address]  URL: [email or web address]  Date: 03/03/2025  Prepared by: Tamela Robles    Exercises  - Shoulder extension with resistance - Neutral  - 1 x daily - 3-5 x weekly - 2 sets - 12-15 reps  - Shoulder Adduction with Anchored Resistance  - 1 x daily - 3-5 x weekly - 2 sets - 12-15 reps  - Standing Tricep Extensions with Resistance  - 1 x daily - 3-5 x weekly - 2 sets - 12-15 reps  - Shoulder Extension with Resistance  - 1 x daily - 3-5 x weekly - 2 sets - 12-15 reps    Has patient been compliant with HEP?  Yes    Billed Treatment Times:  Therapeutic Exercise 38 min    OP EDUCATION:  Outpatient Education  Individual(s) Educated: Patient  Education Provided: POC  Risk and Benefits Discussed with Patient/Caregiver/Other: yes  Patient/Caregiver Demonstrated Understanding: yes  Plan of Care Discussed and Agreed Upon: yes  Education Comment: written HEP    Assessment:  PT Assessment  Assessment Comment: Goals adequate or nearly met.  Pt agreed to being on hold for 30 days.  He will schedule one followup visit in 3 weeks to progress his HEP or if he  heeds to be seen.    Areas of improvements:  Decreased pain  Limitations/deficits:   low level pain with activities    Plan:     Continue with current POC/no changes    Assessment of current progress against goals:  Progressing toward functional goals    Goals:  Active       PT Problem - Lx, thoracic       PT Goal 1 - STG       Start:  01/27/25    Expected End:  03/13/25       1.  Improve shoulder strength to 4+/5 at deficits  2.  Improve sitting posture with correct alignment of Cx region and shoulders  3.  Improve bilateral LE AROM 60 degrees flexion at deficits  4.  Improve LE strength to 4/5 or better at deficits  5.  Improve bilateral hamstring length to 80% of WFL  6.  Improve trunk flexibility to 75% of WFL  7.  Improve trunk strength [place] Fair+/Good-  8.  Pain:  0 to 1 (partially met)  9.  Functional Outcome Measure:  WOMAC 12             PT Goal 2 - LT FUNCTIONAL GOALS       Start:  01/27/25    Expected End:  04/27/25       LONG TERM FUNCTIONAL GOALS  1.  Pt able to feel comfortable bending forward for IADLs - met  2. Pt able to vacuum and performing heavier IADLs 75% of the time with minimal to no pain - nearly met  3. Pt able to sit for up to 10 minutes 75% of the time without needing to stand due to pain - 1 half hour max - met  4. Pt able to stand for up to 10 minutes 80% of the time without needing to sit due to pain - able to stand for up to 30-45 minutes             Patient Stated Goal 1       Start:  01/27/25    Expected End:  04/27/25       85-90%     Doesn't want recurrent of his symptoms  Want to perform heavier activities at home and not have the symptoms return

## 2025-03-03 ENCOUNTER — TREATMENT (OUTPATIENT)
Dept: PHYSICAL THERAPY | Facility: CLINIC | Age: 80
End: 2025-03-03
Payer: MEDICARE

## 2025-03-03 DIAGNOSIS — M54.50 CHRONIC MIDLINE LOW BACK PAIN WITHOUT SCIATICA: ICD-10-CM

## 2025-03-03 DIAGNOSIS — G89.29 CHRONIC MIDLINE LOW BACK PAIN WITHOUT SCIATICA: ICD-10-CM

## 2025-03-03 DIAGNOSIS — M54.6 THORACIC SPINE PAIN: ICD-10-CM

## 2025-03-03 PROCEDURE — 97110 THERAPEUTIC EXERCISES: CPT | Mod: GP | Performed by: PHYSICAL THERAPIST

## 2025-03-03 ASSESSMENT — PAIN SCALES - GENERAL: PAINLEVEL_OUTOF10: 2

## 2025-03-03 ASSESSMENT — PAIN - FUNCTIONAL ASSESSMENT: PAIN_FUNCTIONAL_ASSESSMENT: 0-10

## 2025-03-05 ENCOUNTER — APPOINTMENT (OUTPATIENT)
Dept: PHYSICAL THERAPY | Facility: CLINIC | Age: 80
End: 2025-03-05
Payer: MEDICARE

## 2025-03-10 ENCOUNTER — APPOINTMENT (OUTPATIENT)
Dept: PHYSICAL THERAPY | Facility: CLINIC | Age: 80
End: 2025-03-10
Payer: MEDICARE

## 2025-03-12 ENCOUNTER — APPOINTMENT (OUTPATIENT)
Dept: PHYSICAL THERAPY | Facility: CLINIC | Age: 80
End: 2025-03-12
Payer: MEDICARE

## 2025-03-17 ENCOUNTER — OFFICE VISIT (OUTPATIENT)
Dept: PRIMARY CARE | Facility: CLINIC | Age: 80
End: 2025-03-17
Payer: MEDICARE

## 2025-03-17 ENCOUNTER — APPOINTMENT (OUTPATIENT)
Dept: PRIMARY CARE | Facility: CLINIC | Age: 80
End: 2025-03-17
Payer: MEDICARE

## 2025-03-17 VITALS
HEART RATE: 67 BPM | WEIGHT: 161 LBS | OXYGEN SATURATION: 98 % | SYSTOLIC BLOOD PRESSURE: 112 MMHG | DIASTOLIC BLOOD PRESSURE: 54 MMHG | BODY MASS INDEX: 25.99 KG/M2

## 2025-03-17 DIAGNOSIS — E78.2 MIXED HYPERLIPIDEMIA: ICD-10-CM

## 2025-03-17 DIAGNOSIS — N39.498 OTHER URINARY INCONTINENCE: Primary | ICD-10-CM

## 2025-03-17 DIAGNOSIS — E11.9 TYPE 2 DIABETES MELLITUS WITHOUT COMPLICATION, UNSPECIFIED WHETHER LONG TERM INSULIN USE (MULTI): ICD-10-CM

## 2025-03-17 DIAGNOSIS — I10 BENIGN ESSENTIAL HTN: ICD-10-CM

## 2025-03-17 PROCEDURE — G2211 COMPLEX E/M VISIT ADD ON: HCPCS | Performed by: PHYSICIAN ASSISTANT

## 2025-03-17 PROCEDURE — 3078F DIAST BP <80 MM HG: CPT | Performed by: PHYSICIAN ASSISTANT

## 2025-03-17 PROCEDURE — 1125F AMNT PAIN NOTED PAIN PRSNT: CPT | Performed by: PHYSICIAN ASSISTANT

## 2025-03-17 PROCEDURE — 1160F RVW MEDS BY RX/DR IN RCRD: CPT | Performed by: PHYSICIAN ASSISTANT

## 2025-03-17 PROCEDURE — 3074F SYST BP LT 130 MM HG: CPT | Performed by: PHYSICIAN ASSISTANT

## 2025-03-17 PROCEDURE — 1123F ACP DISCUSS/DSCN MKR DOCD: CPT | Performed by: PHYSICIAN ASSISTANT

## 2025-03-17 PROCEDURE — 1159F MED LIST DOCD IN RCRD: CPT | Performed by: PHYSICIAN ASSISTANT

## 2025-03-17 PROCEDURE — 99213 OFFICE O/P EST LOW 20 MIN: CPT | Performed by: PHYSICIAN ASSISTANT

## 2025-03-17 ASSESSMENT — PAIN SCALES - GENERAL: PAINLEVEL_OUTOF10: 1

## 2025-03-17 ASSESSMENT — PATIENT HEALTH QUESTIONNAIRE - PHQ9
SUM OF ALL RESPONSES TO PHQ9 QUESTIONS 1 AND 2: 0
1. LITTLE INTEREST OR PLEASURE IN DOING THINGS: NOT AT ALL
2. FEELING DOWN, DEPRESSED OR HOPELESS: NOT AT ALL

## 2025-03-17 NOTE — PROGRESS NOTES
Subjective   Patient ID: Pablo Henson is a 79 y.o. male who presents for Hyperlipidemia, Hypertension, and Diabetes.    Hyperlipidemia  This is a chronic problem. The current episode started more than 1 year ago. The problem is controlled. Pertinent negatives include no chest pain or shortness of breath. Current antihyperlipidemic treatment includes statins. The current treatment provides mild improvement of lipids. Risk factors for coronary artery disease include dyslipidemia and male sex.        Review of Systems   Constitutional:  Negative for activity change and appetite change.   Respiratory:  Negative for chest tightness and shortness of breath.    Cardiovascular:  Negative for chest pain, palpitations and leg swelling.   Neurological:  Negative for dizziness, light-headedness and numbness.       Objective   /54   Pulse 67   Wt 73 kg (161 lb)   SpO2 98%   BMI 25.99 kg/m²     Physical Exam  Constitutional:       Appearance: Normal appearance.   Eyes:      Extraocular Movements: Extraocular movements intact.      Pupils: Pupils are equal, round, and reactive to light.   Cardiovascular:      Rate and Rhythm: Normal rate and regular rhythm.      Pulses: Normal pulses.      Heart sounds: Normal heart sounds.   Pulmonary:      Effort: Pulmonary effort is normal.   Musculoskeletal:      Cervical back: Neck supple.      Right lower leg: No edema.      Left lower leg: No edema.   Neurological:      General: No focal deficit present.      Mental Status: He is alert. Mental status is at baseline.   Psychiatric:         Mood and Affect: Mood normal.         Thought Content: Thought content normal.         Judgment: Judgment normal.         Assessment/Plan   Diagnoses and all orders for this visit:  Other urinary incontinence  -     Referral to Physical Therapy; Future  Mixed hyperlipidemia  -     Lipid Panel; Future  Benign essential HTN  Type 2 diabetes mellitus without complication, unspecified whether  long term insulin use (Multi)  -     Comprehensive Metabolic Panel; Future  -     Hemoglobin A1C; Future  Other orders  -     Follow Up In Primary Care - Established    Has issues urinary incontinence since his prostate surgery. Will refer to pelvic PT.   Will also check his A1C as well as his cmp.

## 2025-03-19 LAB
ALBUMIN SERPL-MCNC: 4.7 G/DL (ref 3.6–5.1)
ALP SERPL-CCNC: 128 U/L (ref 35–144)
ALT SERPL-CCNC: 40 U/L (ref 9–46)
ANION GAP SERPL CALCULATED.4IONS-SCNC: 10 MMOL/L (CALC) (ref 7–17)
AST SERPL-CCNC: 26 U/L (ref 10–35)
BILIRUB SERPL-MCNC: 0.7 MG/DL (ref 0.2–1.2)
BUN SERPL-MCNC: 20 MG/DL (ref 7–25)
CALCIUM SERPL-MCNC: 9.4 MG/DL (ref 8.6–10.3)
CHLORIDE SERPL-SCNC: 107 MMOL/L (ref 98–110)
CHOLEST SERPL-MCNC: 113 MG/DL
CHOLEST/HDLC SERPL: 2.4 (CALC)
CO2 SERPL-SCNC: 24 MMOL/L (ref 20–32)
CREAT SERPL-MCNC: 1.32 MG/DL (ref 0.7–1.28)
EGFRCR SERPLBLD CKD-EPI 2021: 56 ML/MIN/1.73M2
EST. AVERAGE GLUCOSE BLD GHB EST-MCNC: 128 MG/DL
EST. AVERAGE GLUCOSE BLD GHB EST-SCNC: 7.1 MMOL/L
GLUCOSE SERPL-MCNC: 104 MG/DL (ref 65–99)
HBA1C MFR BLD: 6.1 % OF TOTAL HGB
HDLC SERPL-MCNC: 47 MG/DL
LDLC SERPL CALC-MCNC: 43 MG/DL (CALC)
NONHDLC SERPL-MCNC: 66 MG/DL (CALC)
POTASSIUM SERPL-SCNC: 4.6 MMOL/L (ref 3.5–5.3)
PROT SERPL-MCNC: 7.1 G/DL (ref 6.1–8.1)
SODIUM SERPL-SCNC: 141 MMOL/L (ref 135–146)
TRIGL SERPL-MCNC: 142 MG/DL

## 2025-03-19 ASSESSMENT — ENCOUNTER SYMPTOMS
PALPITATIONS: 0
ACTIVITY CHANGE: 0
LIGHT-HEADEDNESS: 0
CHEST TIGHTNESS: 0
NUMBNESS: 0
DIZZINESS: 0
APPETITE CHANGE: 0
SHORTNESS OF BREATH: 0

## 2025-03-21 NOTE — PROGRESS NOTES
Physical Therapy Treatment    Patient Name: Pablo Henson  MRN: [id number]  Encounter date:  3/26/2025             Visit Number:  11 (including evaluation)  Planned total visits: 11  Visits Authorized/Insurance Coverage:  : MEDICARE A/B, [place], [place] AUTH, ( $0 USED PT/ST)   PROGRESS REPORT NEEDED AT VISIT #11    Current Problem  Problem List Items Addressed This Visit    None     Precautions         Pain       Subjective  General            Objective  ***    Treatment:  {PT Treatments:13251}  Reviewed pt's HEP and performed the following:     Ladder, mint TB 12x  Pulldown  Adduction  tricep  Shoulder Extension  Stir the pot     Wall push out with resistance (mint TB)        DNP   NuStep, 6', level 2. Hill profile 4,     Moflex [id number]     Wall calve stretch 3x30s  Seated HS stretch 3x30s     Handrail L stretch [id number], laterals 2x15s hold     RSB roll up  15x, then 5x with 10s hold (lean into ball for full stetch)     60 deg doorway pec stretch 3x15s hold  Posterior capsule stretch 2x15s     L back stretch, middle, right left 2x15s each     Counter push ups 10x     Ladder  Yellow crossover bands 12x, twice  Shoulder extension        Current HEP:  Access Code: [email or web address]  URL: [email or web address]  Date: 01/30/2025  Prepared by: Jeremy Jansen     Exercises  - Supine Transversus Abdominis Bracing - Hands on Stomach  - 1 x daily - 7 x weekly - 3 sets - 10 reps - 5 second hold hold  - Supine Posterior Pelvic Tilt  - 1 x daily - 7 x weekly - 3 sets - 10 reps - 5 second  hold  - Supine Single Knee to Chest Stretch  - 1 x daily - 7 x weekly - 3 sets - 20-30 second hold  - Supine Lower Trunk Rotation  - 1 x daily - 7 x weekly - 3 sets - 10 reps  - Seated Hip Abduction with Resistance  - 1 x daily - 7 x weekly - 3 sets - 10 reps  - Seated Hip Adduction Isometrics with Ball  - 1 x daily - 7 x weekly - 3 sets - 10 reps  - Seated Long Arc Quad  - 1 x daily - 7 x weekly - 3 sets - 10 reps  - Standing Alternating  Knee Flexion  - 1 x daily - 7 x weekly - 3 sets - 10 reps  - Correct Seated Posture  - 1 x daily - 7 x weekly - 3 sets - 10 reps  - Seated Hamstring Stretch  - 1 x daily - 7 x weekly - 3 reps - 20-30 seconds hold  - Seated Overhead Press with Dumbbells  - 1 x daily - 7 x weekly - 3 sets - 10 reps  - Seated Chest Press  - 1 x daily - 7 x weekly - 3 sets - 10 reps     Access Code: [email or web address]  URL: [email or web address]  Date: 02/21/2025  Prepared by: Tamela Robles     Exercises  - Standing 'L' Stretch at Counter  - 1 x daily - 3-5 x weekly - 1 sets - 3 reps - 10 sec hold     W's     Access Code: [email or web address]  URL: [email or web address]  Date: 03/03/2025  Prepared by: Tamela Robles     Exercises  - Shoulder extension with resistance - Neutral  - 1 x daily - 3-5 x weekly - 2 sets - 12-15 reps  - Shoulder Adduction with Anchored Resistance  - 1 x daily - 3-5 x weekly - 2 sets - 12-15 reps  - Standing Tricep Extensions with Resistance  - 1 x daily - 3-5 x weekly - 2 sets - 12-15 reps  - Shoulder Extension with Resistance  - 1 x daily - 3-5 x weekly - 2 sets - 12-15    Current HEP:  ***    Has patient been compliant with HEP?  {YES/NO:07364}    Billed Treatment Times:  {Treatment times:02096}    {PT Treatments:41295}  Manual:    ***  Billed Treatment Times:  {Treatment times:23079}      {PT Treatments:31350}  Balance/NMRE:     ***  Billed Treatment Times:  {Treatment times:33047}    {PT Treatments:60308}  Therapeutic Activity:  ***  Billed Treatment Times:  {Treatment times:29409}    OP EDUCATION:       Assessment:     Areas of improvements:  {basassessmentimprovements:50662}  Limitations/deficits:  {basassessmentlimitations/deficits:92676}    Pain end of session:  ***    Plan:     {BASPLAN:09212}    Assessment of current progress against goals:  {BASPTNOTEGOALASSESSMENT:37707}    Goals:  Active       PT Problem - Lx, thoracic       PT Goal 1 - STG       Start:  01/27/25    Expected End:  03/13/25       1.   Improve shoulder strength to 4+/5 at deficits  2.  Improve sitting posture with correct alignment of Cx region and shoulders  3.  Improve bilateral LE AROM 60 degrees flexion at deficits  4.  Improve LE strength to 4/5 or better at deficits  5.  Improve bilateral hamstring length to 80% of WFL  6.  Improve trunk flexibility to 75% of WFL  7.  Improve trunk strength [place] Fair+/Good-  8.  Pain:  0 to 1 (partially met)  9.  Functional Outcome Measure:  WOMAC 12             PT Goal 2 - LT FUNCTIONAL GOALS       Start:  01/27/25    Expected End:  04/27/25       LONG TERM FUNCTIONAL GOALS  1.  Pt able to feel comfortable bending forward for IADLs - met  2. Pt able to vacuum and performing heavier IADLs 75% of the time with minimal to no pain - nearly met  3. Pt able to sit for up to 10 minutes 75% of the time without needing to stand due to pain - 1 half hour max - met  4. Pt able to stand for up to 10 minutes 80% of the time without needing to sit due to pain - able to stand for up to 30-45 minutes             Patient Stated Goal 1       Start:  01/27/25    Expected End:  04/27/25       85-90%     Doesn't want recurrent of his symptoms  Want to perform heavier activities at home and not have the symptoms return

## 2025-03-26 ENCOUNTER — APPOINTMENT (OUTPATIENT)
Dept: PHYSICAL THERAPY | Facility: CLINIC | Age: 80
End: 2025-03-26
Payer: MEDICARE

## 2025-03-28 ENCOUNTER — OFFICE VISIT (OUTPATIENT)
Dept: PRIMARY CARE | Facility: CLINIC | Age: 80
End: 2025-03-28
Payer: MEDICARE

## 2025-03-28 VITALS
WEIGHT: 157 LBS | DIASTOLIC BLOOD PRESSURE: 50 MMHG | HEART RATE: 67 BPM | TEMPERATURE: 97.2 F | OXYGEN SATURATION: 96 % | SYSTOLIC BLOOD PRESSURE: 113 MMHG | BODY MASS INDEX: 25.34 KG/M2

## 2025-03-28 DIAGNOSIS — J06.9 UPPER RESPIRATORY TRACT INFECTION, UNSPECIFIED TYPE: Primary | ICD-10-CM

## 2025-03-28 PROCEDURE — 3078F DIAST BP <80 MM HG: CPT

## 2025-03-28 PROCEDURE — 3074F SYST BP LT 130 MM HG: CPT

## 2025-03-28 PROCEDURE — 1123F ACP DISCUSS/DSCN MKR DOCD: CPT

## 2025-03-28 PROCEDURE — 1125F AMNT PAIN NOTED PAIN PRSNT: CPT

## 2025-03-28 PROCEDURE — 1036F TOBACCO NON-USER: CPT

## 2025-03-28 PROCEDURE — 99213 OFFICE O/P EST LOW 20 MIN: CPT

## 2025-03-28 RX ORDER — FLUTICASONE PROPIONATE 50 MCG
1 SPRAY, SUSPENSION (ML) NASAL DAILY
Qty: 16 G | Refills: 0 | Status: SHIPPED | OUTPATIENT
Start: 2025-03-28

## 2025-03-28 RX ORDER — CETIRIZINE HYDROCHLORIDE 10 MG/1
10 TABLET ORAL DAILY
COMMUNITY

## 2025-03-28 ASSESSMENT — ENCOUNTER SYMPTOMS
HEADACHES: 0
VOMITING: 0
SINUS PRESSURE: 0
ARTHRALGIAS: 1
SINUS PAIN: 0
SORE THROAT: 1
COUGH: 1
NAUSEA: 0
FEVER: 0
RHINORRHEA: 1
SHORTNESS OF BREATH: 0
CHILLS: 1

## 2025-03-28 ASSESSMENT — PAIN SCALES - GENERAL: PAINLEVEL_OUTOF10: 8

## 2025-03-28 ASSESSMENT — PATIENT HEALTH QUESTIONNAIRE - PHQ9
2. FEELING DOWN, DEPRESSED OR HOPELESS: NOT AT ALL
SUM OF ALL RESPONSES TO PHQ9 QUESTIONS 1 AND 2: 0
1. LITTLE INTEREST OR PLEASURE IN DOING THINGS: NOT AT ALL

## 2025-03-28 NOTE — PROGRESS NOTES
Subjective   Patient ID: Pablo Henson is a 79 y.o. male who presents for Cough (Pt is experiencing cough, s/t (burning inside throat), and sinus drainage that began on tues 3/24/2025. Pt has been taking throat lozenges zyrtec and tylenol at home with minimal relief.).    URI   The current episode started in the past 7 days (3/25/25). The problem has been gradually improving. There has been no fever. Associated symptoms include congestion, coughing (dry), rhinorrhea, sneezing and a sore throat. Pertinent negatives include no chest pain, ear pain, headaches, nausea, plugged ear sensation, sinus pain or vomiting. He has tried antihistamine, acetaminophen and increased fluids for the symptoms. The treatment provided moderate relief.   No known sick contacts. Has not done any COVID testing.     Review of Systems   Constitutional:  Positive for chills. Negative for fever.   HENT:  Positive for congestion, postnasal drip, rhinorrhea, sneezing and sore throat. Negative for ear pain, sinus pressure and sinus pain.    Respiratory:  Positive for cough (dry). Negative for shortness of breath.    Cardiovascular:  Negative for chest pain.   Gastrointestinal:  Negative for nausea and vomiting.   Musculoskeletal:  Positive for arthralgias.   Neurological:  Negative for headaches.     Objective   /50 (BP Location: Left arm, Patient Position: Sitting)   Pulse 67   Temp 36.2 °C (97.2 °F) (Temporal)   Wt 71.2 kg (157 lb)   SpO2 96%   BMI 25.34 kg/m²     Physical Exam  Vitals and nursing note reviewed.   Constitutional:       General: He is not in acute distress.  HENT:      Right Ear: Tympanic membrane and ear canal normal.      Left Ear: Tympanic membrane and ear canal normal.      Nose: Congestion present.      Mouth/Throat:      Mouth: Mucous membranes are moist.      Pharynx: No oropharyngeal exudate.   Eyes:      Extraocular Movements: Extraocular movements intact.      Conjunctiva/sclera: Conjunctivae normal.    Cardiovascular:      Rate and Rhythm: Normal rate and regular rhythm.   Pulmonary:      Effort: Pulmonary effort is normal.      Breath sounds: Normal breath sounds.   Musculoskeletal:      Cervical back: Neck supple.      Right lower leg: No edema.      Left lower leg: No edema.   Lymphadenopathy:      Cervical: No cervical adenopathy.   Neurological:      General: No focal deficit present.      Mental Status: He is alert.   Psychiatric:         Mood and Affect: Mood normal.         Assessment/Plan   Assessment & Plan  Upper respiratory tract infection, unspecified type  Acute, improving. Likely viral, provided reassurance.   OTC Tylenol as directed for sinus pain. Flonase as directed for sinus congestion and ear pressure. OTC antihistamine as directed for sinus drainage. OTC Robitussin as directed for dry cough. Increase fluids, rest, humidifier.   Follow up if symptoms do not improve within 7-10 days, or sooner for worsening.     Orders:    fluticasone (Flonase) 50 mcg/actuation nasal spray; Administer 1 spray into each nostril once daily. Shake gently. Before first use, prime pump. After use, clean tip and replace cap.

## 2025-03-28 NOTE — ASSESSMENT & PLAN NOTE
Acute, improving. Likely viral, provided reassurance.   OTC Tylenol as directed for sinus pain. Flonase as directed for sinus congestion and ear pressure. OTC antihistamine as directed for sinus drainage. OTC Robitussin as directed for dry cough. Increase fluids, rest, humidifier.   Follow up if symptoms do not improve within 7-10 days, or sooner for worsening.     Orders:    fluticasone (Flonase) 50 mcg/actuation nasal spray; Administer 1 spray into each nostril once daily. Shake gently. Before first use, prime pump. After use, clean tip and replace cap.

## 2025-04-02 DIAGNOSIS — R73.01 IMPAIRED FASTING GLUCOSE: Primary | ICD-10-CM

## 2025-04-14 ENCOUNTER — TELEPHONE (OUTPATIENT)
Dept: PRIMARY CARE | Facility: CLINIC | Age: 80
End: 2025-04-14
Payer: MEDICARE

## 2025-04-14 DIAGNOSIS — I10 BENIGN ESSENTIAL HTN: ICD-10-CM

## 2025-04-14 RX ORDER — AMLODIPINE BESYLATE 5 MG/1
5 TABLET ORAL DAILY
Qty: 90 TABLET | Refills: 1 | Status: SHIPPED | OUTPATIENT
Start: 2025-04-14 | End: 2025-10-11

## 2025-04-14 NOTE — TELEPHONE ENCOUNTER
Patient is calling in for a refill on Amlodepine 5 mg tabs.  Last seen 03/17/25.  Please send to [facility] on [address].

## 2025-04-19 DIAGNOSIS — J06.9 UPPER RESPIRATORY TRACT INFECTION, UNSPECIFIED TYPE: ICD-10-CM

## 2025-04-21 RX ORDER — FLUTICASONE PROPIONATE 50 MCG
1 SPRAY, SUSPENSION (ML) NASAL DAILY
Qty: 48 ML | Refills: 1 | Status: SHIPPED | OUTPATIENT
Start: 2025-04-21

## 2025-05-13 ENCOUNTER — DOCUMENTATION (OUTPATIENT)
Dept: PHYSICAL THERAPY | Facility: CLINIC | Age: 80
End: 2025-05-13
Payer: MEDICARE

## 2025-05-13 NOTE — PROGRESS NOTES
Physical Therapy    Discharge Summary    Name: Pablo Henson  MRN: [id number]  : 1945  Date: 25    Discharge Summary: PT    Discharge Information: Date of discharge 2025    Therapy Summary: Goals adequate met.  Please report to Progress Note dated 2025    Discharge Status: HEP     Rehab Discharge Reason: Achieved all and/or the most significant goals(s)

## 2025-07-02 DIAGNOSIS — R73.01 IMPAIRED FASTING GLUCOSE: ICD-10-CM

## 2025-07-03 ENCOUNTER — TELEPHONE (OUTPATIENT)
Dept: PRIMARY CARE | Facility: CLINIC | Age: 80
End: 2025-07-03

## 2025-07-03 ENCOUNTER — OFFICE VISIT (OUTPATIENT)
Dept: PRIMARY CARE | Facility: CLINIC | Age: 80
End: 2025-07-03
Payer: MEDICARE

## 2025-07-03 VITALS
HEART RATE: 67 BPM | WEIGHT: 157 LBS | TEMPERATURE: 97.1 F | DIASTOLIC BLOOD PRESSURE: 69 MMHG | OXYGEN SATURATION: 98 % | SYSTOLIC BLOOD PRESSURE: 129 MMHG | BODY MASS INDEX: 25.34 KG/M2

## 2025-07-03 DIAGNOSIS — S39.012A BACK STRAIN, INITIAL ENCOUNTER: Primary | ICD-10-CM

## 2025-07-03 DIAGNOSIS — E11.9 TYPE 2 DIABETES MELLITUS WITHOUT COMPLICATION, UNSPECIFIED WHETHER LONG TERM INSULIN USE: ICD-10-CM

## 2025-07-03 DIAGNOSIS — I10 ESSENTIAL (PRIMARY) HYPERTENSION: ICD-10-CM

## 2025-07-03 PROCEDURE — G2211 COMPLEX E/M VISIT ADD ON: HCPCS | Performed by: PHYSICIAN ASSISTANT

## 2025-07-03 PROCEDURE — 99212 OFFICE O/P EST SF 10 MIN: CPT | Performed by: PHYSICIAN ASSISTANT

## 2025-07-03 PROCEDURE — 1125F AMNT PAIN NOTED PAIN PRSNT: CPT | Performed by: PHYSICIAN ASSISTANT

## 2025-07-03 PROCEDURE — 1159F MED LIST DOCD IN RCRD: CPT | Performed by: PHYSICIAN ASSISTANT

## 2025-07-03 PROCEDURE — 3074F SYST BP LT 130 MM HG: CPT | Performed by: PHYSICIAN ASSISTANT

## 2025-07-03 PROCEDURE — 3078F DIAST BP <80 MM HG: CPT | Performed by: PHYSICIAN ASSISTANT

## 2025-07-03 ASSESSMENT — PAIN SCALES - GENERAL: PAINLEVEL_OUTOF10: 6

## 2025-07-03 NOTE — PROGRESS NOTES
Subjective   Patient ID: Pablo Henson is a 79 y.o. male who presents for Pain (Pt c/o of intermittent rib cage pain.).    HPI   Patient presents today with discomfort in his rib cage.   Has right lower back discomfort. Has had it before.  Started 1-2 weeks ago.   No recent injury.  Takes tylenol. Uses biofreeze.  Wife is in a NH for rehab from a recent broken hip. He is stressed about it.  Review of Systems   Respiratory:  Negative for cough, chest tightness and shortness of breath.    Musculoskeletal:  Positive for arthralgias and myalgias. Negative for gait problem and joint swelling.       Objective   /69   Pulse 67   Temp 36.2 °C (97.1 °F) (Temporal)   Wt 71.2 kg (157 lb)   SpO2 98%   BMI 25.34 kg/m²     Physical Exam  Musculoskeletal:        Arms:       Comments: Has a few muscle knots in his right lower back.   Neurological:      General: No focal deficit present.      Mental Status: He is alert and oriented to person, place, and time. Mental status is at baseline.         Assessment/Plan   Diagnoses and all orders for this visit:  Back strain, initial encounter  Other orders  -     Follow Up In Primary Care - Established; Future  Suggest heat and stretching.         Azithromycin Pregnancy And Lactation Text: This medication is considered safe during pregnancy and is also secreted in breast milk.

## 2025-07-04 DIAGNOSIS — E11.9 TYPE 2 DIABETES MELLITUS WITHOUT COMPLICATION, UNSPECIFIED WHETHER LONG TERM INSULIN USE: ICD-10-CM

## 2025-07-04 DIAGNOSIS — I10 ESSENTIAL (PRIMARY) HYPERTENSION: ICD-10-CM

## 2025-07-06 ASSESSMENT — ENCOUNTER SYMPTOMS
CHEST TIGHTNESS: 0
JOINT SWELLING: 0
MYALGIAS: 1
ARTHRALGIAS: 1
SHORTNESS OF BREATH: 0
COUGH: 0

## 2025-07-14 ENCOUNTER — TELEPHONE (OUTPATIENT)
Dept: PRIMARY CARE | Facility: CLINIC | Age: 80
End: 2025-07-14
Payer: MEDICARE

## 2025-07-14 DIAGNOSIS — E78.2 MIXED HYPERLIPIDEMIA: ICD-10-CM

## 2025-07-14 NOTE — TELEPHONE ENCOUNTER
Patient called Rx line and requested a refill for Atorvastatin 40 mg. Last ov 3/17/2025. Next ov 8/4/2025.  Pharmacy: [facility] [address]

## 2025-07-15 RX ORDER — ATORVASTATIN CALCIUM 40 MG/1
40 TABLET, FILM COATED ORAL DAILY
Qty: 90 TABLET | Refills: 1 | Status: SHIPPED | OUTPATIENT
Start: 2025-07-15 | End: 2026-07-15

## 2025-07-29 ENCOUNTER — TELEPHONE (OUTPATIENT)
Dept: PRIMARY CARE | Facility: CLINIC | Age: 80
End: 2025-07-29
Payer: MEDICARE

## 2025-07-29 DIAGNOSIS — K21.9 GERD WITHOUT ESOPHAGITIS: ICD-10-CM

## 2025-07-29 RX ORDER — OMEPRAZOLE 40 MG/1
40 CAPSULE, DELAYED RELEASE ORAL DAILY
Qty: 90 CAPSULE | Refills: 1 | Status: SHIPPED | OUTPATIENT
Start: 2025-07-29

## 2025-07-31 LAB
ALBUMIN SERPL-MCNC: 4.5 G/DL (ref 3.6–5.1)
ALP SERPL-CCNC: 108 U/L (ref 35–144)
ALT SERPL-CCNC: 41 U/L (ref 9–46)
ANION GAP SERPL CALCULATED.4IONS-SCNC: 7 MMOL/L (CALC) (ref 7–17)
APPEARANCE UR: CLEAR
AST SERPL-CCNC: 23 U/L (ref 10–35)
BILIRUB SERPL-MCNC: 0.6 MG/DL (ref 0.2–1.2)
BILIRUB UR QL STRIP: NEGATIVE
BUN SERPL-MCNC: 20 MG/DL (ref 7–25)
CALCIUM SERPL-MCNC: 9 MG/DL (ref 8.6–10.3)
CHLORIDE SERPL-SCNC: 105 MMOL/L (ref 98–110)
CO2 SERPL-SCNC: 26 MMOL/L (ref 20–32)
COLOR UR: YELLOW
CREAT SERPL-MCNC: 1.3 MG/DL (ref 0.7–1.28)
EGFRCR SERPLBLD CKD-EPI 2021: 56 ML/MIN/1.73M2
EST. AVERAGE GLUCOSE BLD GHB EST-MCNC: 131 MG/DL
EST. AVERAGE GLUCOSE BLD GHB EST-SCNC: 7.3 MMOL/L
GLUCOSE SERPL-MCNC: 99 MG/DL (ref 65–99)
GLUCOSE UR QL STRIP: NEGATIVE
HBA1C MFR BLD: 6.2 %
HGB UR QL STRIP: NEGATIVE
KETONES UR QL STRIP: NEGATIVE
LEUKOCYTE ESTERASE UR QL STRIP: NEGATIVE
NITRITE UR QL STRIP: NEGATIVE
PH UR STRIP: 6 [PH] (ref 5–8)
POTASSIUM SERPL-SCNC: 4.9 MMOL/L (ref 3.5–5.3)
PROT SERPL-MCNC: 7 G/DL (ref 6.1–8.1)
PROT UR QL STRIP: NEGATIVE
SODIUM SERPL-SCNC: 138 MMOL/L (ref 135–146)
SP GR UR STRIP: 1.01 (ref 1–1.03)

## 2025-08-04 ENCOUNTER — APPOINTMENT (OUTPATIENT)
Dept: PRIMARY CARE | Facility: CLINIC | Age: 80
End: 2025-08-04
Payer: MEDICARE

## 2025-08-04 VITALS
HEIGHT: 66 IN | HEART RATE: 64 BPM | TEMPERATURE: 98 F | WEIGHT: 158 LBS | SYSTOLIC BLOOD PRESSURE: 118 MMHG | OXYGEN SATURATION: 97 % | DIASTOLIC BLOOD PRESSURE: 64 MMHG | BODY MASS INDEX: 25.39 KG/M2

## 2025-08-04 DIAGNOSIS — N18.31 CHRONIC KIDNEY DISEASE, STAGE 3A (MULTI): ICD-10-CM

## 2025-08-04 DIAGNOSIS — I10 ESSENTIAL (PRIMARY) HYPERTENSION: ICD-10-CM

## 2025-08-04 DIAGNOSIS — E78.5 HYPERLIPIDEMIA, UNSPECIFIED HYPERLIPIDEMIA TYPE: Primary | ICD-10-CM

## 2025-08-04 PROBLEM — U07.1 DISEASE DUE TO SEVERE ACUTE RESPIRATORY SYNDROME CORONAVIRUS 2 (SARS-COV-2): Status: ACTIVE | Noted: 2022-07-26

## 2025-08-04 PROBLEM — N39.41 URGE INCONTINENCE OF URINE: Status: ACTIVE | Noted: 2025-08-04

## 2025-08-04 PROBLEM — E66.3 OVERWEIGHT WITH BODY MASS INDEX (BMI) 25.0-29.9: Status: ACTIVE | Noted: 2020-07-22

## 2025-08-04 PROBLEM — J35.9 CHRONIC DISEASE OF TONSILS AND ADENOIDS: Status: ACTIVE | Noted: 2021-01-25

## 2025-08-04 PROBLEM — R74.01 HIGH ASPARTATE TRANSAMINASE MEASUREMENT: Status: ACTIVE | Noted: 2020-11-19

## 2025-08-04 PROCEDURE — 99213 OFFICE O/P EST LOW 20 MIN: CPT | Performed by: PHYSICIAN ASSISTANT

## 2025-08-04 PROCEDURE — 3078F DIAST BP <80 MM HG: CPT | Performed by: PHYSICIAN ASSISTANT

## 2025-08-04 PROCEDURE — G2211 COMPLEX E/M VISIT ADD ON: HCPCS | Performed by: PHYSICIAN ASSISTANT

## 2025-08-04 PROCEDURE — 1159F MED LIST DOCD IN RCRD: CPT | Performed by: PHYSICIAN ASSISTANT

## 2025-08-04 PROCEDURE — 3074F SYST BP LT 130 MM HG: CPT | Performed by: PHYSICIAN ASSISTANT

## 2025-08-04 ASSESSMENT — ENCOUNTER SYMPTOMS
PALPITATIONS: 0
CHEST TIGHTNESS: 0
LIGHT-HEADEDNESS: 0
DIZZINESS: 0
HYPERTENSION: 1
APPETITE CHANGE: 0
ACTIVITY CHANGE: 0
NUMBNESS: 0
SHORTNESS OF BREATH: 0

## 2025-08-04 ASSESSMENT — COLUMBIA-SUICIDE SEVERITY RATING SCALE - C-SSRS
1. IN THE PAST MONTH, HAVE YOU WISHED YOU WERE DEAD OR WISHED YOU COULD GO TO SLEEP AND NOT WAKE UP?: NO
2. HAVE YOU ACTUALLY HAD ANY THOUGHTS OF KILLING YOURSELF?: NO
6. HAVE YOU EVER DONE ANYTHING, STARTED TO DO ANYTHING, OR PREPARED TO DO ANYTHING TO END YOUR LIFE?: NO

## 2025-08-04 NOTE — PROGRESS NOTES
"Subjective   Patient ID: Pablo Henson is a 80 y.o. male who presents for Diabetes.    Hypertension  This is a chronic problem. The current episode started more than 1 year ago. The problem is controlled. Pertinent negatives include no chest pain, palpitations or shortness of breath. Risk factors for coronary artery disease include male gender and stress.    His wife recently came home from rehab after a hip fracture repair.  She still not doing very much.  He tries to keep her active but is having a rough time getting her to do things.    Review of Systems   Constitutional:  Negative for activity change and appetite change.   Respiratory:  Negative for chest tightness and shortness of breath.    Cardiovascular:  Negative for chest pain, palpitations and leg swelling.   Neurological:  Negative for dizziness, light-headedness and numbness.       Objective   /64 (BP Location: Left arm, Patient Position: Sitting, BP Cuff Size: Adult)   Pulse 64   Temp 36.7 °C (98 °F) (Temporal)   Ht 1.676 m (5' 6\")   Wt 71.7 kg (158 lb)   SpO2 97%   BMI 25.50 kg/m²     Physical Exam  Constitutional:       Appearance: Normal appearance.     Eyes:      Extraocular Movements: Extraocular movements intact.      Pupils: Pupils are equal, round, and reactive to light.       Cardiovascular:      Rate and Rhythm: Normal rate and regular rhythm.      Pulses: Normal pulses.      Heart sounds: Normal heart sounds.   Pulmonary:      Effort: Pulmonary effort is normal.     Musculoskeletal:      Cervical back: Neck supple.      Right lower leg: No edema.      Left lower leg: No edema.     Neurological:      General: No focal deficit present.      Mental Status: He is alert. Mental status is at baseline.     Psychiatric:         Mood and Affect: Mood normal.         Thought Content: Thought content normal.         Judgment: Judgment normal.         Assessment/Plan   Diagnoses and all orders for this visit:  Hyperlipidemia, " unspecified hyperlipidemia type  Essential (primary) hypertension  Other orders  -     Follow Up In Primary Care - [facility]  -     Follow Up In Primary Care - Medicare Annual; Future  Blood work reviewed.  Kidney functions about the same.  A1c is at 6.2%.  May need to decrease his lisinopril.  Suggested checking blood pressures at home.  Follow-up in 3 months.

## 2025-12-15 ENCOUNTER — APPOINTMENT (OUTPATIENT)
Dept: PRIMARY CARE | Facility: CLINIC | Age: 80
End: 2025-12-15
Payer: MEDICARE